# Patient Record
Sex: FEMALE | Race: WHITE | Employment: FULL TIME
[De-identification: names, ages, dates, MRNs, and addresses within clinical notes are randomized per-mention and may not be internally consistent; named-entity substitution may affect disease eponyms.]

---

## 2017-07-29 ENCOUNTER — HEALTH MAINTENANCE LETTER (OUTPATIENT)
Age: 52
End: 2017-07-29

## 2017-08-21 ENCOUNTER — COMMUNICATION - HEALTHEAST (OUTPATIENT)
Dept: UROLOGY | Facility: CLINIC | Age: 52
End: 2017-08-21

## 2017-08-21 DIAGNOSIS — N20.9 URINARY CALCULUS, UNSPECIFIED: ICD-10-CM

## 2017-08-22 ENCOUNTER — OFFICE VISIT - HEALTHEAST (OUTPATIENT)
Dept: UROLOGY | Facility: CLINIC | Age: 52
End: 2017-08-22

## 2017-08-22 ENCOUNTER — HOSPITAL ENCOUNTER (OUTPATIENT)
Dept: CT IMAGING | Facility: CLINIC | Age: 52
Discharge: HOME | End: 2017-08-22
Attending: PHYSICIAN ASSISTANT

## 2017-08-22 DIAGNOSIS — N20.9 URINARY CALCULUS, UNSPECIFIED: ICD-10-CM

## 2017-08-22 DIAGNOSIS — M54.50 ACUTE LEFT-SIDED LOW BACK PAIN WITHOUT SCIATICA: ICD-10-CM

## 2017-08-28 ENCOUNTER — HOSPITAL ENCOUNTER (OUTPATIENT)
Dept: PHYSICAL MEDICINE AND REHAB | Facility: CLINIC | Age: 52
Discharge: HOME | End: 2017-08-28
Attending: PHYSICIAN ASSISTANT

## 2017-08-28 DIAGNOSIS — M54.16 LUMBAR RADICULITIS: ICD-10-CM

## 2017-08-28 DIAGNOSIS — M79.18 MYOFASCIAL PAIN: ICD-10-CM

## 2017-08-28 DIAGNOSIS — M54.50 LUMBALGIA: ICD-10-CM

## 2017-08-28 ASSESSMENT — MIFFLIN-ST. JEOR: SCORE: 1290.8

## 2017-12-06 ENCOUNTER — OFFICE VISIT - HEALTHEAST (OUTPATIENT)
Dept: FAMILY MEDICINE | Facility: CLINIC | Age: 52
End: 2017-12-06

## 2017-12-06 DIAGNOSIS — G47.00 INSOMNIA: ICD-10-CM

## 2017-12-06 DIAGNOSIS — F33.9 MAJOR DEPRESSION, RECURRENT (H): ICD-10-CM

## 2017-12-06 DIAGNOSIS — Z12.31 VISIT FOR SCREENING MAMMOGRAM: ICD-10-CM

## 2017-12-06 DIAGNOSIS — F41.9 ACUTE ANXIETY: ICD-10-CM

## 2017-12-06 ASSESSMENT — MIFFLIN-ST. JEOR: SCORE: 1266.53

## 2018-01-04 ENCOUNTER — COMMUNICATION - HEALTHEAST (OUTPATIENT)
Dept: FAMILY MEDICINE | Facility: CLINIC | Age: 53
End: 2018-01-04

## 2018-08-20 ENCOUNTER — COMMUNICATION - HEALTHEAST (OUTPATIENT)
Dept: FAMILY MEDICINE | Facility: CLINIC | Age: 53
End: 2018-08-20

## 2018-08-20 DIAGNOSIS — F33.9 MAJOR DEPRESSION, RECURRENT (H): ICD-10-CM

## 2019-04-30 ENCOUNTER — RECORDS - HEALTHEAST (OUTPATIENT)
Dept: MAMMOGRAPHY | Facility: CLINIC | Age: 54
End: 2019-04-30

## 2019-04-30 DIAGNOSIS — Z12.31 ENCOUNTER FOR SCREENING MAMMOGRAM FOR MALIGNANT NEOPLASM OF BREAST: ICD-10-CM

## 2019-05-02 ENCOUNTER — COMMUNICATION - HEALTHEAST (OUTPATIENT)
Dept: ADMINISTRATIVE | Facility: CLINIC | Age: 54
End: 2019-05-02

## 2019-05-02 ENCOUNTER — RECORDS - HEALTHEAST (OUTPATIENT)
Dept: ADMINISTRATIVE | Facility: OTHER | Age: 54
End: 2019-05-02

## 2019-05-07 ENCOUNTER — RECORDS - HEALTHEAST (OUTPATIENT)
Dept: ADMINISTRATIVE | Facility: OTHER | Age: 54
End: 2019-05-07

## 2019-05-07 ENCOUNTER — RECORDS - HEALTHEAST (OUTPATIENT)
Dept: RADIOLOGY | Facility: CLINIC | Age: 54
End: 2019-05-07

## 2019-05-09 ENCOUNTER — HOSPITAL ENCOUNTER (OUTPATIENT)
Dept: MAMMOGRAPHY | Facility: CLINIC | Age: 54
Discharge: HOME | End: 2019-05-09
Attending: MIDWIFE

## 2019-05-09 DIAGNOSIS — N64.89 BREAST ASYMMETRY: ICD-10-CM

## 2019-06-17 ENCOUNTER — HOSPITAL ENCOUNTER (OUTPATIENT)
Dept: LAB | Facility: CLINIC | Age: 54
Discharge: HOME | End: 2019-06-17
Attending: FAMILY MEDICINE | Admitting: FAMILY MEDICINE
Payer: COMMERCIAL

## 2019-06-17 DIAGNOSIS — R51.9 FACIAL PAIN: ICD-10-CM

## 2019-06-17 DIAGNOSIS — M25.50 JOINT PAIN: ICD-10-CM

## 2019-06-17 DIAGNOSIS — E34.9: ICD-10-CM

## 2019-06-17 DIAGNOSIS — G47.9 SLEEP DISORDER: ICD-10-CM

## 2019-06-17 DIAGNOSIS — E06.3 AUTOIMMUNE HYPOTHYROIDISM: ICD-10-CM

## 2019-06-17 DIAGNOSIS — Z83.3 FAMILY HISTORY OF DIABETES MELLITUS: ICD-10-CM

## 2019-06-17 DIAGNOSIS — M79.10 MUSCLE PAIN: ICD-10-CM

## 2019-06-17 DIAGNOSIS — F32.A DEPRESSION, UNSPECIFIED DEPRESSION TYPE: ICD-10-CM

## 2019-06-17 LAB
ABO + RH BLD: NORMAL
ABO + RH BLD: NORMAL
ALBUMIN SERPL-MCNC: 4.2 G/DL (ref 3.4–5)
ALBUMIN UR-MCNC: NEGATIVE MG/DL
ALP SERPL-CCNC: 56 U/L (ref 40–150)
ALT SERPL W P-5'-P-CCNC: 21 U/L (ref 0–50)
ANION GAP SERPL CALCULATED.3IONS-SCNC: 7 MMOL/L (ref 3–14)
APPEARANCE UR: CLEAR
AST SERPL W P-5'-P-CCNC: 17 U/L (ref 0–45)
BASOPHILS # BLD AUTO: 0 10E9/L (ref 0–0.2)
BASOPHILS NFR BLD AUTO: 1.1 %
BILIRUB SERPL-MCNC: 0.5 MG/DL (ref 0.2–1.3)
BILIRUB UR QL STRIP: NEGATIVE
BLOOD BANK CMNT PATIENT-IMP: NORMAL
BUN SERPL-MCNC: 16 MG/DL (ref 7–30)
CALCIUM SERPL-MCNC: 9 MG/DL (ref 8.5–10.1)
CHLORIDE SERPL-SCNC: 106 MMOL/L (ref 94–109)
CHOLEST SERPL-MCNC: 187 MG/DL
CO2 SERPL-SCNC: 26 MMOL/L (ref 20–32)
COLOR UR AUTO: ABNORMAL
CREAT SERPL-MCNC: 0.76 MG/DL (ref 0.52–1.04)
DEPRECATED CALCIDIOL+CALCIFEROL SERPL-MC: 48 UG/L (ref 20–75)
DIFFERENTIAL METHOD BLD: ABNORMAL
EOSINOPHIL # BLD AUTO: 0.1 10E9/L (ref 0–0.7)
EOSINOPHIL NFR BLD AUTO: 1.6 %
ERYTHROCYTE [DISTWIDTH] IN BLOOD BY AUTOMATED COUNT: 12.8 % (ref 10–15)
FERRITIN SERPL-MCNC: 21 NG/ML (ref 8–252)
GFR SERPL CREATININE-BSD FRML MDRD: 90 ML/MIN/{1.73_M2}
GLUCOSE SERPL-MCNC: 77 MG/DL (ref 70–99)
GLUCOSE UR STRIP-MCNC: NEGATIVE MG/DL
HBA1C MFR BLD: 5.5 % (ref 0–5.6)
HCT VFR BLD AUTO: 37.2 % (ref 35–47)
HDLC SERPL-MCNC: 68 MG/DL
HGB BLD-MCNC: 12.6 G/DL (ref 11.7–15.7)
HGB UR QL STRIP: NEGATIVE
IMM GRANULOCYTES # BLD: 0 10E9/L (ref 0–0.4)
IMM GRANULOCYTES NFR BLD: 0 %
INSULIN SERPL-ACNC: 2.6 MU/L (ref 3–25)
IRON SATN MFR SERPL: 17 % (ref 15–46)
IRON SERPL-MCNC: 61 UG/DL (ref 35–180)
KETONES UR STRIP-MCNC: NEGATIVE MG/DL
LDLC SERPL CALC-MCNC: 109 MG/DL
LEUKOCYTE ESTERASE UR QL STRIP: NEGATIVE
LYMPHOCYTES # BLD AUTO: 1.3 10E9/L (ref 0.8–5.3)
LYMPHOCYTES NFR BLD AUTO: 34.2 %
MCH RBC QN AUTO: 30.7 PG (ref 26.5–33)
MCHC RBC AUTO-ENTMCNC: 33.9 G/DL (ref 31.5–36.5)
MCV RBC AUTO: 91 FL (ref 78–100)
MONOCYTES # BLD AUTO: 0.4 10E9/L (ref 0–1.3)
MONOCYTES NFR BLD AUTO: 10.2 %
NEUTROPHILS # BLD AUTO: 2 10E9/L (ref 1.6–8.3)
NEUTROPHILS NFR BLD AUTO: 52.9 %
NITRATE UR QL: NEGATIVE
NONHDLC SERPL-MCNC: 119 MG/DL
PH UR STRIP: 6 PH (ref 5–7)
PLATELET # BLD AUTO: 215 10E9/L (ref 150–450)
POTASSIUM SERPL-SCNC: 3.7 MMOL/L (ref 3.4–5.3)
PROT SERPL-MCNC: 8.3 G/DL (ref 6.8–8.8)
RBC # BLD AUTO: 4.1 10E12/L (ref 3.8–5.2)
RBC #/AREA URNS AUTO: <1 /HPF (ref 0–2)
SODIUM SERPL-SCNC: 139 MMOL/L (ref 133–144)
SOURCE: ABNORMAL
SP GR UR STRIP: 1.01 (ref 1–1.03)
SPECIMEN EXP DATE BLD: NORMAL
SQUAMOUS #/AREA URNS AUTO: 2 /HPF (ref 0–1)
T3FREE SERPL-MCNC: 2.7 PG/ML (ref 2.3–4.2)
T4 FREE SERPL-MCNC: 0.91 NG/DL (ref 0.76–1.46)
TIBC SERPL-MCNC: 357 UG/DL (ref 240–430)
TRIGL SERPL-MCNC: 49 MG/DL
TSH SERPL DL<=0.005 MIU/L-ACNC: 2.72 MU/L (ref 0.4–4)
UROBILINOGEN UR STRIP-MCNC: NORMAL MG/DL (ref 0–2)
WBC # BLD AUTO: 3.3 10E9/L (ref 4–11)
WBC #/AREA URNS AUTO: 0 /HPF (ref 0–5)

## 2019-06-17 PROCEDURE — 84443 ASSAY THYROID STIM HORMONE: CPT | Performed by: FAMILY MEDICINE

## 2019-06-17 PROCEDURE — 84481 FREE ASSAY (FT-3): CPT | Performed by: FAMILY MEDICINE

## 2019-06-17 PROCEDURE — 85025 COMPLETE CBC W/AUTO DIFF WBC: CPT | Performed by: FAMILY MEDICINE

## 2019-06-17 PROCEDURE — 81001 URINALYSIS AUTO W/SCOPE: CPT | Performed by: FAMILY MEDICINE

## 2019-06-17 PROCEDURE — 83516 IMMUNOASSAY NONANTIBODY: CPT | Performed by: FAMILY MEDICINE

## 2019-06-17 PROCEDURE — 86618 LYME DISEASE ANTIBODY: CPT | Performed by: FAMILY MEDICINE

## 2019-06-17 PROCEDURE — 83036 HEMOGLOBIN GLYCOSYLATED A1C: CPT | Performed by: FAMILY MEDICINE

## 2019-06-17 PROCEDURE — 82784 ASSAY IGA/IGD/IGG/IGM EACH: CPT | Performed by: FAMILY MEDICINE

## 2019-06-17 PROCEDURE — 86038 ANTINUCLEAR ANTIBODIES: CPT | Performed by: FAMILY MEDICINE

## 2019-06-17 PROCEDURE — 83090 ASSAY OF HOMOCYSTEINE: CPT | Performed by: FAMILY MEDICINE

## 2019-06-17 PROCEDURE — 86376 MICROSOMAL ANTIBODY EACH: CPT | Performed by: FAMILY MEDICINE

## 2019-06-17 PROCEDURE — 80061 LIPID PANEL: CPT | Performed by: FAMILY MEDICINE

## 2019-06-17 PROCEDURE — 82306 VITAMIN D 25 HYDROXY: CPT | Performed by: FAMILY MEDICINE

## 2019-06-17 PROCEDURE — 83550 IRON BINDING TEST: CPT | Performed by: FAMILY MEDICINE

## 2019-06-17 PROCEDURE — 86800 THYROGLOBULIN ANTIBODY: CPT | Performed by: FAMILY MEDICINE

## 2019-06-17 PROCEDURE — 83540 ASSAY OF IRON: CPT | Performed by: FAMILY MEDICINE

## 2019-06-17 PROCEDURE — 82728 ASSAY OF FERRITIN: CPT | Performed by: FAMILY MEDICINE

## 2019-06-17 PROCEDURE — 86901 BLOOD TYPING SEROLOGIC RH(D): CPT | Performed by: FAMILY MEDICINE

## 2019-06-17 PROCEDURE — 36415 COLL VENOUS BLD VENIPUNCTURE: CPT | Performed by: FAMILY MEDICINE

## 2019-06-17 PROCEDURE — 84439 ASSAY OF FREE THYROXINE: CPT | Performed by: FAMILY MEDICINE

## 2019-06-17 PROCEDURE — 86900 BLOOD TYPING SEROLOGIC ABO: CPT | Performed by: FAMILY MEDICINE

## 2019-06-17 PROCEDURE — 80053 COMPREHEN METABOLIC PANEL: CPT | Performed by: FAMILY MEDICINE

## 2019-06-17 PROCEDURE — 83525 ASSAY OF INSULIN: CPT | Performed by: FAMILY MEDICINE

## 2019-06-17 PROCEDURE — 86141 C-REACTIVE PROTEIN HS: CPT | Performed by: FAMILY MEDICINE

## 2019-06-18 LAB
ANA SER QL IF: NEGATIVE
B BURGDOR IGG+IGM SER QL: 0.11 (ref 0–0.89)
CRP SERPL HS-MCNC: 0.5 MG/L
IGA SERPL-MCNC: 302 MG/DL (ref 70–380)
THYROGLOB AB SERPL IA-ACNC: <20 IU/ML (ref 0–40)
THYROPEROXIDASE AB SERPL-ACNC: <10 IU/ML
TTG IGA SER-ACNC: 1 U/ML
TTG IGG SER-ACNC: <1 U/ML

## 2019-06-19 LAB — HCYS SERPL-SCNC: 7.6 UMOL/L (ref 4–12)

## 2019-07-11 ENCOUNTER — HOSPITAL ENCOUNTER (OUTPATIENT)
Dept: PHYSICAL MEDICINE AND REHAB | Facility: CLINIC | Age: 54
Discharge: HOME | End: 2019-07-11
Attending: NURSE PRACTITIONER

## 2019-07-11 DIAGNOSIS — M54.14 THORACIC RADICULITIS: ICD-10-CM

## 2019-07-11 DIAGNOSIS — M54.6 CHRONIC BILATERAL THORACIC BACK PAIN: ICD-10-CM

## 2019-07-11 DIAGNOSIS — G89.29 CHRONIC BILATERAL THORACIC BACK PAIN: ICD-10-CM

## 2019-07-11 DIAGNOSIS — M79.18 MYOFASCIAL PAIN: ICD-10-CM

## 2019-07-11 DIAGNOSIS — M54.50 CHRONIC BILATERAL LOW BACK PAIN WITHOUT SCIATICA: ICD-10-CM

## 2019-07-11 DIAGNOSIS — M51.34 THORACIC DEGENERATIVE DISC DISEASE: ICD-10-CM

## 2019-07-11 DIAGNOSIS — G89.29 CHRONIC BILATERAL LOW BACK PAIN WITHOUT SCIATICA: ICD-10-CM

## 2019-07-19 ENCOUNTER — OFFICE VISIT - HEALTHEAST (OUTPATIENT)
Dept: PHYSICAL THERAPY | Facility: REHABILITATION | Age: 54
End: 2019-07-19

## 2019-07-19 DIAGNOSIS — R07.81 RIB PAIN: ICD-10-CM

## 2019-07-19 DIAGNOSIS — M54.6 PAIN IN THORACIC SPINE: ICD-10-CM

## 2019-07-19 DIAGNOSIS — G89.29 CHRONIC BILATERAL LOW BACK PAIN WITHOUT SCIATICA: ICD-10-CM

## 2019-07-19 DIAGNOSIS — M54.50 CHRONIC BILATERAL LOW BACK PAIN WITHOUT SCIATICA: ICD-10-CM

## 2019-07-30 ENCOUNTER — OFFICE VISIT - HEALTHEAST (OUTPATIENT)
Dept: PHYSICAL THERAPY | Facility: REHABILITATION | Age: 54
End: 2019-07-30

## 2019-07-30 DIAGNOSIS — M54.6 PAIN IN THORACIC SPINE: ICD-10-CM

## 2019-07-30 DIAGNOSIS — M54.50 CHRONIC BILATERAL LOW BACK PAIN WITHOUT SCIATICA: ICD-10-CM

## 2019-07-30 DIAGNOSIS — R07.81 RIB PAIN: ICD-10-CM

## 2019-07-30 DIAGNOSIS — G89.29 CHRONIC BILATERAL LOW BACK PAIN WITHOUT SCIATICA: ICD-10-CM

## 2019-08-06 ENCOUNTER — OFFICE VISIT - HEALTHEAST (OUTPATIENT)
Dept: PHYSICAL THERAPY | Facility: REHABILITATION | Age: 54
End: 2019-08-06

## 2019-08-06 DIAGNOSIS — R07.81 RIB PAIN: ICD-10-CM

## 2019-08-06 DIAGNOSIS — M54.6 PAIN IN THORACIC SPINE: ICD-10-CM

## 2019-08-06 DIAGNOSIS — G89.29 CHRONIC BILATERAL LOW BACK PAIN WITHOUT SCIATICA: ICD-10-CM

## 2019-08-06 DIAGNOSIS — M54.50 CHRONIC BILATERAL LOW BACK PAIN WITHOUT SCIATICA: ICD-10-CM

## 2019-08-08 ENCOUNTER — OFFICE VISIT - HEALTHEAST (OUTPATIENT)
Dept: PHYSICAL THERAPY | Facility: REHABILITATION | Age: 54
End: 2019-08-08

## 2019-08-08 DIAGNOSIS — M54.6 PAIN IN THORACIC SPINE: ICD-10-CM

## 2019-08-08 DIAGNOSIS — M54.50 CHRONIC BILATERAL LOW BACK PAIN WITHOUT SCIATICA: ICD-10-CM

## 2019-08-08 DIAGNOSIS — G89.29 CHRONIC BILATERAL LOW BACK PAIN WITHOUT SCIATICA: ICD-10-CM

## 2019-08-08 DIAGNOSIS — R07.81 RIB PAIN: ICD-10-CM

## 2019-08-13 ENCOUNTER — OFFICE VISIT - HEALTHEAST (OUTPATIENT)
Dept: PHYSICAL THERAPY | Facility: REHABILITATION | Age: 54
End: 2019-08-13

## 2019-08-13 DIAGNOSIS — G89.29 CHRONIC BILATERAL LOW BACK PAIN WITHOUT SCIATICA: ICD-10-CM

## 2019-08-13 DIAGNOSIS — R07.81 RIB PAIN: ICD-10-CM

## 2019-08-13 DIAGNOSIS — M54.50 CHRONIC BILATERAL LOW BACK PAIN WITHOUT SCIATICA: ICD-10-CM

## 2019-08-13 DIAGNOSIS — M54.6 PAIN IN THORACIC SPINE: ICD-10-CM

## 2019-08-15 ENCOUNTER — OFFICE VISIT - HEALTHEAST (OUTPATIENT)
Dept: PHYSICAL THERAPY | Facility: REHABILITATION | Age: 54
End: 2019-08-15

## 2019-08-15 DIAGNOSIS — M54.6 PAIN IN THORACIC SPINE: ICD-10-CM

## 2019-08-15 DIAGNOSIS — G89.29 CHRONIC BILATERAL LOW BACK PAIN WITHOUT SCIATICA: ICD-10-CM

## 2019-08-15 DIAGNOSIS — M54.50 CHRONIC BILATERAL LOW BACK PAIN WITHOUT SCIATICA: ICD-10-CM

## 2019-08-15 DIAGNOSIS — R07.81 RIB PAIN: ICD-10-CM

## 2019-08-20 ENCOUNTER — OFFICE VISIT - HEALTHEAST (OUTPATIENT)
Dept: PHYSICAL THERAPY | Facility: REHABILITATION | Age: 54
End: 2019-08-20

## 2019-08-20 DIAGNOSIS — M54.6 PAIN IN THORACIC SPINE: ICD-10-CM

## 2019-08-20 DIAGNOSIS — G89.29 CHRONIC BILATERAL LOW BACK PAIN WITHOUT SCIATICA: ICD-10-CM

## 2019-08-20 DIAGNOSIS — M54.50 CHRONIC BILATERAL LOW BACK PAIN WITHOUT SCIATICA: ICD-10-CM

## 2019-08-20 DIAGNOSIS — R07.81 RIB PAIN: ICD-10-CM

## 2019-08-22 ENCOUNTER — OFFICE VISIT - HEALTHEAST (OUTPATIENT)
Dept: PHYSICAL THERAPY | Facility: REHABILITATION | Age: 54
End: 2019-08-22

## 2019-08-22 DIAGNOSIS — G89.29 CHRONIC BILATERAL LOW BACK PAIN WITHOUT SCIATICA: ICD-10-CM

## 2019-08-22 DIAGNOSIS — M54.6 PAIN IN THORACIC SPINE: ICD-10-CM

## 2019-08-22 DIAGNOSIS — R07.81 RIB PAIN: ICD-10-CM

## 2019-08-22 DIAGNOSIS — M54.50 CHRONIC BILATERAL LOW BACK PAIN WITHOUT SCIATICA: ICD-10-CM

## 2019-08-27 ENCOUNTER — OFFICE VISIT - HEALTHEAST (OUTPATIENT)
Dept: PHYSICAL THERAPY | Facility: REHABILITATION | Age: 54
End: 2019-08-27

## 2019-08-27 DIAGNOSIS — M54.50 CHRONIC BILATERAL LOW BACK PAIN WITHOUT SCIATICA: ICD-10-CM

## 2019-08-27 DIAGNOSIS — M54.6 PAIN IN THORACIC SPINE: ICD-10-CM

## 2019-08-27 DIAGNOSIS — R07.81 RIB PAIN: ICD-10-CM

## 2019-08-27 DIAGNOSIS — G89.29 CHRONIC BILATERAL LOW BACK PAIN WITHOUT SCIATICA: ICD-10-CM

## 2019-08-29 ENCOUNTER — COMMUNICATION - HEALTHEAST (OUTPATIENT)
Dept: PHYSICAL MEDICINE AND REHAB | Facility: CLINIC | Age: 54
End: 2019-08-29

## 2019-08-29 ENCOUNTER — AMBULATORY - HEALTHEAST (OUTPATIENT)
Dept: PHYSICAL MEDICINE AND REHAB | Facility: CLINIC | Age: 54
End: 2019-08-29

## 2019-08-29 ENCOUNTER — OFFICE VISIT - HEALTHEAST (OUTPATIENT)
Dept: PHYSICAL THERAPY | Facility: REHABILITATION | Age: 54
End: 2019-08-29

## 2019-08-29 DIAGNOSIS — M54.6 CHRONIC BILATERAL THORACIC BACK PAIN: ICD-10-CM

## 2019-08-29 DIAGNOSIS — M51.34 THORACIC DEGENERATIVE DISC DISEASE: ICD-10-CM

## 2019-08-29 DIAGNOSIS — M47.816 LUMBAR FACET ARTHROPATHY: ICD-10-CM

## 2019-08-29 DIAGNOSIS — G89.29 CHRONIC BILATERAL THORACIC BACK PAIN: ICD-10-CM

## 2019-08-29 DIAGNOSIS — G89.29 CHRONIC BILATERAL LOW BACK PAIN WITHOUT SCIATICA: ICD-10-CM

## 2019-08-29 DIAGNOSIS — R07.81 RIB PAIN: ICD-10-CM

## 2019-08-29 DIAGNOSIS — M54.50 CHRONIC BILATERAL LOW BACK PAIN WITHOUT SCIATICA: ICD-10-CM

## 2019-08-29 DIAGNOSIS — M54.14 THORACIC RADICULITIS: ICD-10-CM

## 2019-08-29 DIAGNOSIS — M79.18 MYOFASCIAL PAIN: ICD-10-CM

## 2019-08-29 DIAGNOSIS — M54.6 PAIN IN THORACIC SPINE: ICD-10-CM

## 2019-09-03 ENCOUNTER — OFFICE VISIT - HEALTHEAST (OUTPATIENT)
Dept: PHYSICAL THERAPY | Facility: REHABILITATION | Age: 54
End: 2019-09-03

## 2019-09-03 DIAGNOSIS — G89.29 CHRONIC BILATERAL LOW BACK PAIN WITHOUT SCIATICA: ICD-10-CM

## 2019-09-03 DIAGNOSIS — M54.50 CHRONIC BILATERAL LOW BACK PAIN WITHOUT SCIATICA: ICD-10-CM

## 2019-09-03 DIAGNOSIS — M54.6 PAIN IN THORACIC SPINE: ICD-10-CM

## 2019-09-03 DIAGNOSIS — R07.81 RIB PAIN: ICD-10-CM

## 2019-09-05 ENCOUNTER — OFFICE VISIT - HEALTHEAST (OUTPATIENT)
Dept: PHYSICAL THERAPY | Facility: REHABILITATION | Age: 54
End: 2019-09-05

## 2019-09-05 ENCOUNTER — HOSPITAL ENCOUNTER (OUTPATIENT)
Dept: MRI IMAGING | Facility: CLINIC | Age: 54
Discharge: HOME | End: 2019-09-05

## 2019-09-05 DIAGNOSIS — M47.816 LUMBAR FACET ARTHROPATHY: ICD-10-CM

## 2019-09-05 DIAGNOSIS — M54.50 CHRONIC BILATERAL LOW BACK PAIN WITHOUT SCIATICA: ICD-10-CM

## 2019-09-05 DIAGNOSIS — M54.14 THORACIC RADICULITIS: ICD-10-CM

## 2019-09-05 DIAGNOSIS — G89.29 CHRONIC BILATERAL THORACIC BACK PAIN: ICD-10-CM

## 2019-09-05 DIAGNOSIS — G89.29 CHRONIC BILATERAL LOW BACK PAIN WITHOUT SCIATICA: ICD-10-CM

## 2019-09-05 DIAGNOSIS — R07.81 RIB PAIN: ICD-10-CM

## 2019-09-05 DIAGNOSIS — M54.6 CHRONIC BILATERAL THORACIC BACK PAIN: ICD-10-CM

## 2019-09-05 DIAGNOSIS — M54.6 PAIN IN THORACIC SPINE: ICD-10-CM

## 2019-09-05 DIAGNOSIS — M51.34 THORACIC DEGENERATIVE DISC DISEASE: ICD-10-CM

## 2019-09-10 ENCOUNTER — COMMUNICATION - HEALTHEAST (OUTPATIENT)
Dept: PHYSICAL MEDICINE AND REHAB | Facility: CLINIC | Age: 54
End: 2019-09-10

## 2019-09-12 ENCOUNTER — OFFICE VISIT - HEALTHEAST (OUTPATIENT)
Dept: PHYSICAL THERAPY | Facility: REHABILITATION | Age: 54
End: 2019-09-12

## 2019-09-12 DIAGNOSIS — G89.29 CHRONIC BILATERAL LOW BACK PAIN WITHOUT SCIATICA: ICD-10-CM

## 2019-09-12 DIAGNOSIS — R07.81 RIB PAIN: ICD-10-CM

## 2019-09-12 DIAGNOSIS — M54.50 CHRONIC BILATERAL LOW BACK PAIN WITHOUT SCIATICA: ICD-10-CM

## 2019-09-12 DIAGNOSIS — M54.6 PAIN IN THORACIC SPINE: ICD-10-CM

## 2019-09-20 ENCOUNTER — OFFICE VISIT - HEALTHEAST (OUTPATIENT)
Dept: PHYSICAL THERAPY | Facility: REHABILITATION | Age: 54
End: 2019-09-20

## 2019-09-20 DIAGNOSIS — R07.81 RIB PAIN: ICD-10-CM

## 2019-09-20 DIAGNOSIS — G89.29 CHRONIC BILATERAL LOW BACK PAIN WITHOUT SCIATICA: ICD-10-CM

## 2019-09-20 DIAGNOSIS — M54.50 CHRONIC BILATERAL LOW BACK PAIN WITHOUT SCIATICA: ICD-10-CM

## 2019-09-20 DIAGNOSIS — M54.6 PAIN IN THORACIC SPINE: ICD-10-CM

## 2019-09-24 ENCOUNTER — OFFICE VISIT - HEALTHEAST (OUTPATIENT)
Dept: PHYSICAL THERAPY | Facility: REHABILITATION | Age: 54
End: 2019-09-24

## 2019-09-24 ENCOUNTER — VIRTUAL VISIT (OUTPATIENT)
Dept: FAMILY MEDICINE | Facility: OTHER | Age: 54
End: 2019-09-24

## 2019-09-24 DIAGNOSIS — G89.29 CHRONIC BILATERAL LOW BACK PAIN WITHOUT SCIATICA: ICD-10-CM

## 2019-09-24 DIAGNOSIS — R07.81 RIB PAIN: ICD-10-CM

## 2019-09-24 DIAGNOSIS — M54.6 PAIN IN THORACIC SPINE: ICD-10-CM

## 2019-09-24 DIAGNOSIS — M54.50 CHRONIC BILATERAL LOW BACK PAIN WITHOUT SCIATICA: ICD-10-CM

## 2019-09-24 NOTE — PROGRESS NOTES
"Date:   Clinician: Dennis Weathers  Clinician NPI: [id number]  Patient: Aysha vieira  Patient : 1965  Patient Address: [address] [place] [address]  Patient Phone: [phone]  Visit Protocol: URI  Patient Summary:  Aysha is a 53 year old ( : 1965 ) female who initiated a Visit for cold, sinus infection, or influenza. When asked the question \"Please sign me up to receive news, health information and promotions from [employer].\", Aysha responded \"No\".    Aysha states her symptoms started gradually 7-9 days ago. After her symptoms started, they improved and then got worse again.   Her symptoms consist of malaise, a cough, facial pain or pressure, and nasal congestion. She is experiencing mild difficulty breathing with activities but can speak normally in full sentences.   Symptom details     Nasal secretions: The color of her mucus is clear.    Cough: Aysha coughs every 5-10 minutes and her cough is not more bothersome at night. Phlegm comes into her throat when she coughs. She believes the phlegm causes the cough. The color of the phlegm is clear.     Facial pain or pressure: The facial pain or pressure feels worse when bending over or leaning forward.      Aysha denies having rhinitis, wheezing, teeth pain, headache, sore throat, fever, myalgias, chills, and ear pain. She also denies having recent facial or sinus surgery in the past 60 days, having a sinus infection within the past year, and taking antibiotic medication for the symptoms.   Precipitating events  She has not recently been exposed to someone with influenza. Aysha has not been in close contact with any high risk individuals.   Pertinent medical history  Aysha does not get yeast infections when she takes antibiotics.   Weight: 135 lbs   Aysha does not smoke or use smokeless tobacco.   Additional information as reported by the patient (free text): it started with sore throat, exhausted, runny nose " and severe sinus headaches last Monday with fever around 100. Those symptoms shifted midweek and then the cough started, head still congested and now heavy chest and still exhausted. Staying home from work several days.     MEDICATIONS: Imitrex oral, gabapentin oral, ALLERGIES: NKDA  Clinician Response:  Dear Aysha,  Based on the information provided, you have acute bacterial sinusitis, also known as a sinus infection. Sinus infections are caused by bacteria or a virus and symptoms are almost always identical. The difference between the 2 types of infections is timing.  Sinus infections start as viral infections and symptoms improve on their own in about 7 days. If symptoms have not improved after 7 days or have even worsened, a bacterial infection may have developed.  Medication information  I am prescribing:       Fluticasone 50 mcg/actuation nasal spray. Inhale 2 sprays in each nostril 1 time per day; after 1 week, may adjust to 1 - 2 sprays in each nostril 1 time per day. This medication takes several days to start working, so keep taking it even if it doesn't help right away. There are no refills with this prescription.      Amoxicillin 500 mg oral tablet. Take 1 tablet by mouth every 8 hours for 10 days. There are no refills with this prescription.     Yeast infections can be a common side effect of antibiotics. The most common symptom of a yeast infection is itchiness in and around the vagina. Other signs and symptoms include burning, redness, or a thick, white vaginal discharge that looks like cottage cheese and does not have a bad smell.  Self care  The following tips will keep you as comfortable as possible while you recover:     Rest    Drink plenty of water and other liquids    Take a hot shower to loosen congestion    Take a spoonful of honey to reduce your cough     When to seek care  Please be seen in a clinic or urgent care if any of the following occur:     Symptoms do not start to improve after 3  days of treatment    New symptoms develop, or symptoms become worse     It is possible to have an allergic reaction to an antibiotic even if you have not had one in the past. If you notice a new rash, significant swelling, or difficulty breathing, stop taking this medication immediately and go to a clinic or urgent care.   Diagnosis: Acute bacterial sinusitis  Diagnosis ICD: J01.90  Prescription: fluticasone 50 mcg/actuation nasal spray,suspension 1 120 spray aerosol with adapter (grams), 30 days supply. Inhale 2 sprays in each nostril 1 time per day; after 1 week, may adjust to 1 - 2 sprays in each nostril 1 time per day.. Refills: 0, Refill as needed: no, Allow substitutions: yes  Prescription: amoxicillin 500 mg oral tablet 30 tablet, 10 days supply. Take 1 tablet by mouth every 8 hours for 10 days. Refills: 0, Refill as needed: no, Allow substitutions: yes  Pharmacy: [id number] - [phone] - [address] [facility] , [place] [address]

## 2019-10-03 ENCOUNTER — OFFICE VISIT - HEALTHEAST (OUTPATIENT)
Dept: PHYSICAL THERAPY | Facility: REHABILITATION | Age: 54
End: 2019-10-03

## 2019-10-03 DIAGNOSIS — M54.6 PAIN IN THORACIC SPINE: ICD-10-CM

## 2019-10-03 DIAGNOSIS — R07.81 RIB PAIN: ICD-10-CM

## 2019-10-03 DIAGNOSIS — G89.29 CHRONIC BILATERAL LOW BACK PAIN WITHOUT SCIATICA: ICD-10-CM

## 2019-10-03 DIAGNOSIS — M54.50 CHRONIC BILATERAL LOW BACK PAIN WITHOUT SCIATICA: ICD-10-CM

## 2019-10-08 ENCOUNTER — OFFICE VISIT - HEALTHEAST (OUTPATIENT)
Dept: PHYSICAL THERAPY | Facility: REHABILITATION | Age: 54
End: 2019-10-08

## 2019-10-08 DIAGNOSIS — M54.50 CHRONIC BILATERAL LOW BACK PAIN WITHOUT SCIATICA: ICD-10-CM

## 2019-10-08 DIAGNOSIS — G89.29 CHRONIC BILATERAL LOW BACK PAIN WITHOUT SCIATICA: ICD-10-CM

## 2019-10-08 DIAGNOSIS — R07.81 RIB PAIN: ICD-10-CM

## 2019-10-08 DIAGNOSIS — M54.6 PAIN IN THORACIC SPINE: ICD-10-CM

## 2019-10-10 ENCOUNTER — OFFICE VISIT - HEALTHEAST (OUTPATIENT)
Dept: PHYSICAL THERAPY | Facility: REHABILITATION | Age: 54
End: 2019-10-10

## 2019-10-10 DIAGNOSIS — M54.50 CHRONIC BILATERAL LOW BACK PAIN WITHOUT SCIATICA: ICD-10-CM

## 2019-10-10 DIAGNOSIS — R07.81 RIB PAIN: ICD-10-CM

## 2019-10-10 DIAGNOSIS — M54.6 PAIN IN THORACIC SPINE: ICD-10-CM

## 2019-10-10 DIAGNOSIS — G89.29 CHRONIC BILATERAL LOW BACK PAIN WITHOUT SCIATICA: ICD-10-CM

## 2019-10-23 ENCOUNTER — OFFICE VISIT - HEALTHEAST (OUTPATIENT)
Dept: PHYSICAL THERAPY | Facility: REHABILITATION | Age: 54
End: 2019-10-23

## 2019-10-23 DIAGNOSIS — G89.29 CHRONIC BILATERAL LOW BACK PAIN WITHOUT SCIATICA: ICD-10-CM

## 2019-10-23 DIAGNOSIS — M54.50 CHRONIC BILATERAL LOW BACK PAIN WITHOUT SCIATICA: ICD-10-CM

## 2019-10-23 DIAGNOSIS — M54.6 PAIN IN THORACIC SPINE: ICD-10-CM

## 2019-10-23 DIAGNOSIS — R07.81 RIB PAIN: ICD-10-CM

## 2019-10-25 ENCOUNTER — OFFICE VISIT - HEALTHEAST (OUTPATIENT)
Dept: PHYSICAL THERAPY | Facility: REHABILITATION | Age: 54
End: 2019-10-25

## 2019-10-25 DIAGNOSIS — R07.81 RIB PAIN: ICD-10-CM

## 2019-10-25 DIAGNOSIS — M54.50 CHRONIC BILATERAL LOW BACK PAIN WITHOUT SCIATICA: ICD-10-CM

## 2019-10-25 DIAGNOSIS — G89.29 CHRONIC BILATERAL LOW BACK PAIN WITHOUT SCIATICA: ICD-10-CM

## 2019-10-25 DIAGNOSIS — M54.6 PAIN IN THORACIC SPINE: ICD-10-CM

## 2019-10-29 ENCOUNTER — OFFICE VISIT - HEALTHEAST (OUTPATIENT)
Dept: PHYSICAL THERAPY | Facility: REHABILITATION | Age: 54
End: 2019-10-29

## 2019-10-29 DIAGNOSIS — G89.29 CHRONIC BILATERAL LOW BACK PAIN WITHOUT SCIATICA: ICD-10-CM

## 2019-10-29 DIAGNOSIS — M54.50 CHRONIC BILATERAL LOW BACK PAIN WITHOUT SCIATICA: ICD-10-CM

## 2019-10-29 DIAGNOSIS — R07.81 RIB PAIN: ICD-10-CM

## 2019-10-29 DIAGNOSIS — M54.6 PAIN IN THORACIC SPINE: ICD-10-CM

## 2019-11-01 ENCOUNTER — OFFICE VISIT - HEALTHEAST (OUTPATIENT)
Dept: PHYSICAL THERAPY | Facility: REHABILITATION | Age: 54
End: 2019-11-01

## 2019-11-01 DIAGNOSIS — R07.81 RIB PAIN: ICD-10-CM

## 2019-11-01 DIAGNOSIS — M54.50 CHRONIC BILATERAL LOW BACK PAIN WITHOUT SCIATICA: ICD-10-CM

## 2019-11-01 DIAGNOSIS — M54.6 PAIN IN THORACIC SPINE: ICD-10-CM

## 2019-11-01 DIAGNOSIS — G89.29 CHRONIC BILATERAL LOW BACK PAIN WITHOUT SCIATICA: ICD-10-CM

## 2019-11-04 ENCOUNTER — HEALTH MAINTENANCE LETTER (OUTPATIENT)
Age: 54
End: 2019-11-04

## 2019-11-05 ENCOUNTER — OFFICE VISIT - HEALTHEAST (OUTPATIENT)
Dept: PHYSICAL THERAPY | Facility: REHABILITATION | Age: 54
End: 2019-11-05

## 2019-11-05 DIAGNOSIS — M54.6 PAIN IN THORACIC SPINE: ICD-10-CM

## 2019-11-05 DIAGNOSIS — R07.81 RIB PAIN: ICD-10-CM

## 2019-11-05 DIAGNOSIS — M54.50 CHRONIC BILATERAL LOW BACK PAIN WITHOUT SCIATICA: ICD-10-CM

## 2019-11-05 DIAGNOSIS — G89.29 CHRONIC BILATERAL LOW BACK PAIN WITHOUT SCIATICA: ICD-10-CM

## 2019-11-07 ENCOUNTER — OFFICE VISIT - HEALTHEAST (OUTPATIENT)
Dept: PHYSICAL THERAPY | Facility: REHABILITATION | Age: 54
End: 2019-11-07

## 2019-11-07 DIAGNOSIS — G43.009 MIGRAINE WITHOUT AURA: ICD-10-CM

## 2019-11-07 DIAGNOSIS — M54.50 CHRONIC BILATERAL LOW BACK PAIN WITHOUT SCIATICA: ICD-10-CM

## 2019-11-07 DIAGNOSIS — M54.2 CERVICALGIA: ICD-10-CM

## 2019-11-07 DIAGNOSIS — G89.29 CHRONIC BILATERAL LOW BACK PAIN WITHOUT SCIATICA: ICD-10-CM

## 2019-11-07 DIAGNOSIS — M54.6 PAIN IN THORACIC SPINE: ICD-10-CM

## 2019-11-07 DIAGNOSIS — R07.81 RIB PAIN: ICD-10-CM

## 2019-11-07 DIAGNOSIS — G43.011 INTRACTABLE MIGRAINE WITHOUT AURA AND WITH STATUS MIGRAINOSUS: ICD-10-CM

## 2019-11-11 ENCOUNTER — OFFICE VISIT - HEALTHEAST (OUTPATIENT)
Dept: PHYSICAL THERAPY | Facility: REHABILITATION | Age: 54
End: 2019-11-11

## 2019-11-11 DIAGNOSIS — M54.2 CERVICALGIA: ICD-10-CM

## 2019-11-11 DIAGNOSIS — G89.29 CHRONIC BILATERAL LOW BACK PAIN WITHOUT SCIATICA: ICD-10-CM

## 2019-11-11 DIAGNOSIS — M54.50 CHRONIC BILATERAL LOW BACK PAIN WITHOUT SCIATICA: ICD-10-CM

## 2019-11-11 DIAGNOSIS — R07.81 RIB PAIN: ICD-10-CM

## 2019-11-11 DIAGNOSIS — G43.009 MIGRAINE WITHOUT AURA AND WITHOUT STATUS MIGRAINOSUS, NOT INTRACTABLE: ICD-10-CM

## 2019-11-11 DIAGNOSIS — M54.6 PAIN IN THORACIC SPINE: ICD-10-CM

## 2019-11-11 DIAGNOSIS — G43.009 MIGRAINE WITHOUT AURA: ICD-10-CM

## 2019-11-19 ENCOUNTER — COMMUNICATION - HEALTHEAST (OUTPATIENT)
Dept: PHYSICAL THERAPY | Facility: REHABILITATION | Age: 54
End: 2019-11-19

## 2019-11-19 ENCOUNTER — OFFICE VISIT - HEALTHEAST (OUTPATIENT)
Dept: PHYSICAL THERAPY | Facility: REHABILITATION | Age: 54
End: 2019-11-19

## 2019-11-19 DIAGNOSIS — M54.50 CHRONIC BILATERAL LOW BACK PAIN WITHOUT SCIATICA: ICD-10-CM

## 2019-11-19 DIAGNOSIS — M54.2 CERVICALGIA: ICD-10-CM

## 2019-11-19 DIAGNOSIS — G43.009 MIGRAINE WITHOUT AURA AND WITHOUT STATUS MIGRAINOSUS, NOT INTRACTABLE: ICD-10-CM

## 2019-11-19 DIAGNOSIS — G89.29 CHRONIC BILATERAL LOW BACK PAIN WITHOUT SCIATICA: ICD-10-CM

## 2019-11-19 DIAGNOSIS — M54.6 PAIN IN THORACIC SPINE: ICD-10-CM

## 2019-11-19 DIAGNOSIS — R07.81 RIB PAIN: ICD-10-CM

## 2019-11-22 ENCOUNTER — OFFICE VISIT - HEALTHEAST (OUTPATIENT)
Dept: PHYSICAL THERAPY | Facility: REHABILITATION | Age: 54
End: 2019-11-22

## 2019-11-22 DIAGNOSIS — M54.50 CHRONIC BILATERAL LOW BACK PAIN WITHOUT SCIATICA: ICD-10-CM

## 2019-11-22 DIAGNOSIS — G43.009 MIGRAINE WITHOUT AURA AND WITHOUT STATUS MIGRAINOSUS, NOT INTRACTABLE: ICD-10-CM

## 2019-11-22 DIAGNOSIS — M54.6 PAIN IN THORACIC SPINE: ICD-10-CM

## 2019-11-22 DIAGNOSIS — R07.81 RIB PAIN: ICD-10-CM

## 2019-11-22 DIAGNOSIS — G89.29 CHRONIC BILATERAL LOW BACK PAIN WITHOUT SCIATICA: ICD-10-CM

## 2019-11-22 DIAGNOSIS — M54.2 CERVICALGIA: ICD-10-CM

## 2019-11-25 ENCOUNTER — OFFICE VISIT - HEALTHEAST (OUTPATIENT)
Dept: PHYSICAL THERAPY | Facility: REHABILITATION | Age: 54
End: 2019-11-25

## 2019-11-25 DIAGNOSIS — M54.2 CERVICALGIA: ICD-10-CM

## 2019-11-25 DIAGNOSIS — G43.009 MIGRAINE WITHOUT AURA AND WITHOUT STATUS MIGRAINOSUS, NOT INTRACTABLE: ICD-10-CM

## 2019-11-25 DIAGNOSIS — M54.50 CHRONIC BILATERAL LOW BACK PAIN WITHOUT SCIATICA: ICD-10-CM

## 2019-11-25 DIAGNOSIS — M54.6 PAIN IN THORACIC SPINE: ICD-10-CM

## 2019-11-25 DIAGNOSIS — G89.29 CHRONIC BILATERAL LOW BACK PAIN WITHOUT SCIATICA: ICD-10-CM

## 2019-11-25 DIAGNOSIS — R07.81 RIB PAIN: ICD-10-CM

## 2019-12-02 ENCOUNTER — OFFICE VISIT - HEALTHEAST (OUTPATIENT)
Dept: PHYSICAL THERAPY | Facility: REHABILITATION | Age: 54
End: 2019-12-02

## 2019-12-02 DIAGNOSIS — M54.2 CERVICALGIA: ICD-10-CM

## 2019-12-02 DIAGNOSIS — G89.29 CHRONIC BILATERAL LOW BACK PAIN WITHOUT SCIATICA: ICD-10-CM

## 2019-12-02 DIAGNOSIS — G43.009 MIGRAINE WITHOUT AURA AND WITHOUT STATUS MIGRAINOSUS, NOT INTRACTABLE: ICD-10-CM

## 2019-12-02 DIAGNOSIS — M54.6 PAIN IN THORACIC SPINE: ICD-10-CM

## 2019-12-02 DIAGNOSIS — M54.50 CHRONIC BILATERAL LOW BACK PAIN WITHOUT SCIATICA: ICD-10-CM

## 2019-12-02 DIAGNOSIS — R07.81 RIB PAIN: ICD-10-CM

## 2019-12-10 ENCOUNTER — OFFICE VISIT - HEALTHEAST (OUTPATIENT)
Dept: PHYSICAL THERAPY | Facility: REHABILITATION | Age: 54
End: 2019-12-10

## 2019-12-10 DIAGNOSIS — M54.2 CERVICALGIA: ICD-10-CM

## 2019-12-10 DIAGNOSIS — G43.009 MIGRAINE WITHOUT AURA AND WITHOUT STATUS MIGRAINOSUS, NOT INTRACTABLE: ICD-10-CM

## 2019-12-10 DIAGNOSIS — M54.50 CHRONIC BILATERAL LOW BACK PAIN WITHOUT SCIATICA: ICD-10-CM

## 2019-12-10 DIAGNOSIS — M54.6 PAIN IN THORACIC SPINE: ICD-10-CM

## 2019-12-10 DIAGNOSIS — R07.81 RIB PAIN: ICD-10-CM

## 2019-12-10 DIAGNOSIS — G89.29 CHRONIC BILATERAL LOW BACK PAIN WITHOUT SCIATICA: ICD-10-CM

## 2019-12-12 ENCOUNTER — HOSPITAL ENCOUNTER (OUTPATIENT)
Dept: PHYSICAL MEDICINE AND REHAB | Facility: CLINIC | Age: 54
Discharge: HOME | End: 2019-12-12
Attending: NURSE PRACTITIONER

## 2019-12-12 DIAGNOSIS — G89.29 CHRONIC BILATERAL THORACIC BACK PAIN: ICD-10-CM

## 2019-12-12 DIAGNOSIS — M51.24 THORACIC DISC HERNIATION: ICD-10-CM

## 2019-12-12 DIAGNOSIS — M47.816 LUMBAR FACET ARTHROPATHY: ICD-10-CM

## 2019-12-12 DIAGNOSIS — M54.14 THORACIC RADICULITIS: ICD-10-CM

## 2019-12-12 DIAGNOSIS — M54.6 CHRONIC BILATERAL THORACIC BACK PAIN: ICD-10-CM

## 2019-12-12 DIAGNOSIS — M51.34 THORACIC DEGENERATIVE DISC DISEASE: ICD-10-CM

## 2019-12-12 DIAGNOSIS — G89.29 CHRONIC BILATERAL LOW BACK PAIN WITHOUT SCIATICA: ICD-10-CM

## 2019-12-12 DIAGNOSIS — M54.50 CHRONIC BILATERAL LOW BACK PAIN WITHOUT SCIATICA: ICD-10-CM

## 2019-12-12 DIAGNOSIS — M79.18 MYOFASCIAL PAIN: ICD-10-CM

## 2019-12-20 ENCOUNTER — OFFICE VISIT - HEALTHEAST (OUTPATIENT)
Dept: PHYSICAL THERAPY | Facility: REHABILITATION | Age: 54
End: 2019-12-20

## 2019-12-20 DIAGNOSIS — M54.6 PAIN IN THORACIC SPINE: ICD-10-CM

## 2019-12-20 DIAGNOSIS — M54.50 CHRONIC BILATERAL LOW BACK PAIN WITHOUT SCIATICA: ICD-10-CM

## 2019-12-20 DIAGNOSIS — R07.81 RIB PAIN: ICD-10-CM

## 2019-12-20 DIAGNOSIS — G89.29 CHRONIC BILATERAL LOW BACK PAIN WITHOUT SCIATICA: ICD-10-CM

## 2019-12-20 DIAGNOSIS — G43.009 MIGRAINE WITHOUT AURA AND WITHOUT STATUS MIGRAINOSUS, NOT INTRACTABLE: ICD-10-CM

## 2019-12-20 DIAGNOSIS — M54.2 CERVICALGIA: ICD-10-CM

## 2020-11-22 ENCOUNTER — HEALTH MAINTENANCE LETTER (OUTPATIENT)
Age: 55
End: 2020-11-22

## 2021-02-13 ENCOUNTER — HEALTH MAINTENANCE LETTER (OUTPATIENT)
Age: 56
End: 2021-02-13

## 2021-05-30 NOTE — PROGRESS NOTES
Assessment:     Diagnoses and all orders for this visit:    Thoracic radiculitis  -     Ambulatory referral to PT/OT  -     gabapentin (NEURONTIN) 100 MG capsule; Take 300 mg by mouth 3 (three) times a day. Follow Gabapentin Dosing chart given  Dispense: 270 capsule; Refill: 3  -     HYDROcodone-acetaminophen 5-325 mg per tablet; Take 1 tablet by mouth 2 (two) times a day as needed for pain (Severe breakthrough pain).  Dispense: 8 tablet; Refill: 0    Thoracic degenerative disc disease  -     Ambulatory referral to PT/OT    Chronic bilateral thoracic back pain  -     Ambulatory referral to PT/OT  -     gabapentin (NEURONTIN) 100 MG capsule; Take 300 mg by mouth 3 (three) times a day. Follow Gabapentin Dosing chart given  Dispense: 270 capsule; Refill: 3    Chronic bilateral low back pain without sciatica  -     Ambulatory referral to PT/OT    Myofascial pain  -     Ambulatory referral to PT/OT       Aysha Gallagher is a 53 y.o. y.o. female patient last seen by LESLIE Woo for lumbar radiculitis 2017, with past medical history significant for migraine without aura, urolithiasis, who presents today for follow-up regarding:    -Chronic thoracic pain right greater than left with thoracic radiculitis.  Previous CT scan 2017 does show degenerative disc changes at the T11-12 level with possible disc bulge which could be contributing to pain.    -Chronic low back pain less intense than thoracic spine pain, no lumbar radicular symptoms.     -Mild lateral hip pain left greater than right consistent with mild greater trochanteric bursitis.     Patient is neurologically intact on exam.  No myelopathic or red flag symptoms.    EDMUNDO Score: 54     Plan:     A shared decision making plan was used. The patient's values and choices were respected. Prior medical records from 8/28/2017 to current were reviewed today. The following represents what was discussed and decided upon by the provider and the patient.         -DIAGNOSTIC TESTS: Images were personally reviewed and interpreted.   --Discussed that we could consider a thoracic spine MRI at any time.  Patient prefers to hold off which is reasonable as she is neurologically intact.  Discussed that if symptoms worsen at any time or not improving with physical therapy if this would be the next step in her plan which she is in agreements with.  If lumbar spine pain worsen down the road could consider lumbar spine MRI however her lower thoracic spine is most bothersome at this time.  --Reviewed abdominal/pelvic CT scan from 8/22/2017 which did show degenerative disc changes at T11-12 with possible disc bulge shadowing noted.  There is also lower lumbar facet arthropathy noted.    -INTERVENTIONS: Could consider thoracic right LAMBERT down the road pending MRI if symptoms are not improving.  Discussed this option with her in clinic today.    -MEDICATIONS: Prescribed gabapentin 100 mg to titrate up to 3 tablets 3 times daily as tolerated for thoracic radicular type pain, dosing chart given.  -Also prescribed hydrocodone 5/325 mg 1 tablet twice daily as needed for severe breakthrough pain, number 8 tablets given for 4 days worth.  This is for acute pain only.  Discussed side effects of medications and proper use. Patient verbalized understanding.    -PHYSICAL THERAPY: Referral to physical therapy [facility] placed today for thoracic radicular pain as well as myofascial pain and to establish core strengthening for thoracic spine region.  Discussed the importance of core strengthening, ROM, stretching exercises with the patient and how each of these entities is important in decreasing pain.  Explained to the patient that the purpose of physical therapy is to teach the patient a home exercise program.  These exercises need to be performed every day in order to decrease pain and prevent future occurrences of pain.        -PATIENT EDUCATION:  40 minutes of total visit time was  spent face to face with the patient today, 60 % of the visit was spent on counseling, education, and coordinating care.   -5 minutes spent outside of visit time, non-face-to-face time, reviewing chart.    -FOLLOW UP: Follow-up if symptoms are not improving or new symptoms arise at any time.  Advised to contact clinic if symptoms worsen or change.    Subjective:     Aysha Gallagher is a 53 y.o. female who presents today for follow-up regarding ongoing generalized thoracic spine pain that is worse at the lower thoracic region that radiates around to the lower abdomen at the level of the navel most significant on the right but does occasionally occur on the left, this is been intermittent however flareup more significant for the last couple of months.  Patient is very active with rowing and feels this has been aggravated lately, she is had intolerance to running as well recently.  Patient does endorse that today her pain is an 8/10, 9 at its worst, a 5 at its best.  Patient does endorse lower lumbar spine pain as well however this is less intense than her lower thoracic spine pain, she does have some pain rating to the lateral hips left greater than right that is more tolerable as this time as well.  Otherwise patient denies any lower extremity radiating pain, denies numbness or tingling sensations, denies weakness or recent trips or falls, denies bowel or bladder loss control.    Treatment to Date: No prior spinal surgery or spinal injection.  Tizanidine 4 mg  Flexeril 5 mg    Patient Active Problem List   Diagnosis     Urolithiasis     Breast lump on right side at 10 o'clock position     Acute left-sided low back pain without sciatica     Migraine without aura       Current Outpatient Medications on File Prior to Encounter   Medication Sig Dispense Refill     b complex vitamins tablet Take 1 tablet by mouth daily.       calcium citrate (CALCITRATE) 200 mg (950 mg) tablet Take 1 tablet by mouth daily.        CHOLECALCIFEROL, VITAMIN D3, (VITAMIN D3 ORAL) Take by mouth.       magnesium oxide 250 mg Tab Take by mouth.       NON FORMULARY HERBAL SUPPLEMENT FROM ACCUPUNCTURIST       OM-3/E/LINOL/ALA/OLEIC/GLA/LIP (OMEGA 3-6-9 ORAL) Take by mouth.       sertraline (ZOLOFT) 50 MG tablet TAKE 1 TABLET(50 MG) BY MOUTH DAILY 30 tablet 2     SUMAtriptan (IMITREX) 25 MG tablet Take 50 mg by mouth every 2 (two) hours as needed for migraine.       cyclobenzaprine (FLEXERIL) 5 MG tablet Take 5 mg by mouth 3 (three) times a day as needed for muscle spasms.       LORazepam (ATIVAN) 0.5 MG tablet Take 1 tablet (0.5 mg total) by mouth every 8 (eight) hours as needed for anxiety. 30 tablet 0     tiZANidine (ZANAFLEX) 4 MG tablet TK SS T PO QHS THEN INCREASE IN SS T INCREMENTS TO 4 TS D AS TOLERATED  2     traZODone (DESYREL) 50 MG tablet Take 1 tablet (50 mg total) by mouth at bedtime. 30 tablet 1     [DISCONTINUED] HYDROcodone-acetaminophen 5-325 mg per tablet Take 1 tablet by mouth every 4 (four) hours as needed for pain.       No current facility-administered medications on file prior to encounter.        No Known Allergies    Past Medical History:   Diagnosis Date     Depression      Kidney stone     2016     Lactose intolerance      Migraine         Review of Systems  ROS: Positive for muscle pain.  Specifically negative for bowel/bladder dysfunction, balance changes, headache, dizziness, foot drop, fevers, chills, appetite changes, nausea/vomiting, unexplained weight loss. Otherwise 13 systems reviewed are negative. Please see the patient's intake questionnaire from today for details.    Reviewed Social, Family, Past Medical and Past Surgical history with patient, no significant changes noted since prior visit.     Objective:     /60 (Patient Site: Right Arm, Patient Position: Sitting)   Pulse 64   Wt 138 lb (62.6 kg)   SpO2 100%   BMI 20.38 kg/m      PHYSICAL EXAMINATION:    --CONSTITUTIONAL: Well developed, well  nourished, healthy appearing individual.  --PSYCHIATRIC: Appropriate mood and affect. No difficulty interacting due to temper, social withdrawal, or memory issues.  --SKIN: Lumbar region is dry and intact.   --RESPIRATORY: Normal rhythm and effort. No abnormal accessory muscle breathing patterns noted.   --MUSCULOSKELETAL:  Normal lumbar lordosis noted, no lateral shift.  --GROSS MOTOR: Rises from seated position with some difficulty and hesitancy due to pain.  Gait is non-antalgic  --LUMBAR SPINE:  Inspection reveals no evidence of deformity. Range of motion is limited in all movements specifically with forward flexion with rounding the spine and lumbar extension.  Tenderness to palpation midline thoracic spine T2-T7 as well as T9-T12 region, minimal tenderness lumbar spine. Straight leg raising in the seated position is negative to radicular pain. Sciatic notch non-tender.   --SACROILIAC JOINT: One Finger point test negative.  --LOWER EXTREMITY MOTOR TESTING:  Plantar flexion left 5/5, right 5/5   Dorsiflexion left 5/5, right 5/5   Great toe MTP extension left 5/5, right 5/5  Knee flexion left 5/5, right 5/5  Knee extension left 5/5, right 5/5   Hip flexion left 5/5, right 5/5  Hip abduction left 5/5, right 5/5  Hip adduction left 5/5, right 5/5   --HIPS: Full range of motion bilaterally.   --NEUROLOGIC: Bilateral patellar and achilles reflexes are physiologic and symmetric. Sensation to light touch is intact in the bilateral L4, L5, and S1 dermatomes.    CERVICAL:   --HEENT:  Sclera are non-injected.  Extraocular muscles are intact.  Thyroid moves easily upon swallowing.  Moist oral mucosa.  --SKIN:  Skin over the face, bilateral upper extremities, and posterior torso is clean, dry, intact without rashes.  --UPPER EXTREMITY MOTOR TESTING:  Wrist flexion left 5/5, right 5/5  Wrist extension left 5/5, right 5/5  Pronators left 5/5, right 5/5  Biceps left 5/5, right 5/5   Triceps left 5/5, right 5/5   Shoulder  abduction left 5/5, right 5/5   left 5/5, right 5/5  --NEUROLOGIC:  CN III-XII are grossly intact.  Bilateral patellar and achilles reflexes are physiologic and symmetric. 2/4 symmetric biceps, brachioradialis, triceps reflexes bilaterally.  Sensation to upper extremities is intact.  Negative Motta's bilaterally.    --VASCULAR:  2/4 radial pulses bilaterally.  Warm upper limbs bilaterally.  Capillary refill in the upper extremities is less than 1 second. No obvious lower extremity swelling or varicosities.   --CERVICAL SPINE: Inspection reveals no evidence of deformity. No tenderness to palpation cervical spine.      RESULTS:   Imaging: Lumbar spine imaging was reviewed today. The images were shown to the patient and the findings were explained using a spine model.      CT ABDOMEN PELVIS WO ORAL WO IV CONTRAST  8/22/2017   INDICATION: Flank pain, stone known or suspected  TECHNIQUE: Routine CT abdomen and pelvis without oral or IV contrast. Multiplanar reformation images (MPR). Dose reduction techniques were used.  COMPARISON: 9/23/2016 CT.  FINDINGS:  LUNG BASES: Negative.  ABDOMEN: No renal or ureteral stones. No hydronephrosis. Unenhanced liver, spleen, adrenals, gallbladder, and pancreas appear normal.  PELVIS: Moderate amount stool in colon. Normal-appearing small bowel.  MUSCULOSKELETAL: Mild lower lumbar facet arthropathy.  CONCLUSION:  1.  No renal or ureteral stone seen.

## 2021-05-30 NOTE — PROGRESS NOTES
"[facility] Daily Progress     Patient Name: Aysha Raya-Otremba  Date: 2019  Visit #2/16  Referral Diagnosis:   Thoracic radiculitis   Thoracic degenerative disc disease   Chronic bilateral thoracic back pain   Chronic bilateral low back pain without sciatica   Myofascial pain   Referring provider: Amanda Sandhu C*  Visit Diagnosis:     ICD-10-CM    1. Pain in thoracic spine M54.6    2. Chronic bilateral low back pain without sciatica M54.5     G89.29    3. Rib pain R07.81          Assessment:     Patient is appropriate to continue with skilled physical therapy intervention, as indicated by initial plan of care.    Goal Status:  Pt. will demonstrate/verbalize independence in self-management of condition in : 12 weeks  Pt. will be independent with home exercise program in : 12 weeks  Pt. will report decreased intensity, frequency of : Pain;in 12 weeks;Comment  Comment:: decrease thoracic pain by 25% or better  Pt. will have improved quality of sleep: waking less times/night;in 12 weeks;Comment  Comment:: waking 0-1x /night  Pt. will be able to walk : 60 minutes;with less pain;with less difficulty;for community mobility;for exercise/recreation;in 12 weeks  Patient will stand : 60 minutes;with less difficultty;with less pain;for home chores;in 12 weeks  Pt. will bend: to dress;to clean;with less pain;with less difficulty;for self care;for exercise/recreation;in 12 weeks  Patient will twist/turn : in bed;while standing;with less difficulty;with less pain;for bed mobilitiy;in 12 weeks  Patient will return to: sport;exercise;in 12 weeks    No data recorded    Plan / Patient Education:     Continue with initial plan of care.  Progress with home program as tolerated.    Subjective:     Pain Ratin  Today \"has been a good day\". Dull/achy pain vs. Sharp pain.       Objective:     Cranial scan is (+) for (L) thoracic myochains.  Mult tender points in (B) rib cage/ant axillary line.   Decreased " fascial mobility of rib cage, thorax, and associated structures.     Treatment Today     TREATMENT MINUTES COMMENTS   Evaluation     Self-care/ Home management     Manual therapy 45 Induction, indirect, direct techniques utilized as appropriate for optimal tissue release.   MFR/SCS - (L) AR5/7/8-MS, respiratory diaphragm, (B) rot rib cage release, (B) diagonal ant thoracic release, liver,    Neuromuscular Re-education     Therapeutic Activity     Therapeutic Exercises 10 Exercises per flow sheet.   Instructed in new exercises.    Gait training     Modality__________________                Total 55    Blank areas are intentional and mean the treatment did not include these items.       Vaishali Gallego  7/30/2019

## 2021-05-30 NOTE — PATIENT INSTRUCTIONS - HE
Discussed the importance of core strengthening, ROM, stretching exercises with the patient and how each of these entities is important in decreasing pain.  Explained to the patient that the purpose of physical therapy is to teach the patient a home exercise program.  These exercises need to be performed every day in order to decrease pain and prevent future occurrences of pain.        ~Please call Nurse Navigation line [phone] with any questions or concerns about your treatment plan, if symptoms worsen and you would like to be seen urgently, or if you have problems controlling bladder and bowel function.  ~Follow Up Appointment time slots with Amanda Sandhu CNP with [facility], are also available at the [facility] location near [facility] on the first and third THURSDAY afternoons of each month.    Prescribed Gabapentin today, 100 mg tablets, to be titrated up to 3 tablets 3 times a day as tolerated for your nerve pain. Please follow Gabapentin dosing chart below.    Gabapentin 100mg Dosing Chart    DATE  MORNING AFTERNOON BEDTIME    Day 1 0 0 1    Day 2 0 0 1    Day 3 0 0 1    Day 4 1 0 1    Day 5 1 0 1    Day 6 1 0 1    Day 7 1 1 1    Day 8 1 1 1    Day 9 1 1 1    Day 10 1 1 2    Day 11 1 1 2    Day 12 1 1 2    Day 13 2 1 2    Day 14 2 1 2    Day 15 2 1 2    Day 16 2 2 2    Day 17 2 2 2    Day 18 2 2 2    Day 19 2 2 3    Day 20 2 2 3    Day 21 2 2 3    Day 22 3 2 3    Day 23 3 2 3    Day 24 3 2 3    Day 25 3 3 3    Day 26 3 3 3    Day 27 3 3 3     Continue medication, taking 3 capsules three times daily    Please call if you have any questions regarding how to take your medication

## 2021-05-30 NOTE — PROGRESS NOTES
[facility]   [place]-P[facility] Initial Evaluation    Patient Name: Aysha Gallagher  Date of evaluation: 7/19/2019  Visit #1  Referral Diagnosis:   Thoracic radiculitis   Thoracic degenerative disc disease   Chronic bilateral thoracic back pain   Chronic bilateral low back pain without sciatica   Myofascial pain   Referring provider: Amanda Sandhu C*  Visit Diagnosis:     ICD-10-CM    1. Pain in thoracic spine M54.6    2. Chronic bilateral low back pain without sciatica M54.5     G89.29    3. Rib pain R07.81        Assessment:   Aysha Gallagher is a 53 y.o. female who presents to therapy today with chief complaints of thoracic pain, rib pain, low back pain. Onset date of sx was 2018.  Functional impairments include standing, walking, sitting, twisting, sleeping, bending, lifting, exercise, coughing, sneezing .  Clinical findings include guarded posture with transfers, decreased trunk ROM with pain, multiple tender points in ant/lat/post rib cage, (B) thoracic spine, SI joints.        Pt. is appropriate for skilled PT intervention as outlined in the Plan of Care (POC).    Goals:  Pt. will demonstrate/verbalize independence in self-management of condition in : 12 weeks  Pt. will be independent with home exercise program in : 12 weeks  Pt. will report decreased intensity, frequency of : Pain;in 12 weeks;Comment  Comment:: decrease thoracic pain by 25% or better  Pt. will have improved quality of sleep: waking less times/night;in 12 weeks;Comment  Comment:: waking 0-1x /night  Pt. will be able to walk : 60 minutes;with less pain;with less difficulty;for community mobility;for exercise/recreation;in 12 weeks  Patient will stand : 60 minutes;with less difficultty;with less pain;for home chores;in 12 weeks  Pt. will bend: to dress;to clean;with less pain;with less difficulty;for self care;for exercise/recreation;in 12 weeks  Patient will twist/turn : in bed;while standing;with less  difficulty;with less pain;for bed mobilitiy;in 12 weeks  Patient will return to: sport;exercise;in 12 weeks    No data recorded    Patient's expectations/goals are realistic.    Barriers to Learning or Achieving Goals:  No Barriers.       Plan / Patient Instructions:        Plan of Care:   Authorization / Certification Number of Visits: pref one  Communication with: Referral Source  Patient Related Instruction: Nature of Condition;Treatment plan and rationale;Self Care instruction;Basis of treatment;Body mechanics;Posture;Next steps  Times per Week: 1-2  Number of Weeks: 6-8  Number of Visits: up to 16  Discharge Planning: HEP, self management  Precautions / Restrictions : none  Therapeutic Exercise: ROM;Stretching;Strengthening  Neuromuscular Reeducation: posture;kinesio tape;core  Manual Therapy: strain counterstrain;myofascial release      POC and pathology of condition were reviewed with patient.  Pt. is in agreement with the Plan of Care    Plan for next visit: manual therapy, initiate home program     Subjective:         Social information:   Living Situation:   Occupation:   Work Status:Working full time   Equipment Available: None    History of Present Illness:    Aysha is a 53 y.o. female who presents to therapy today with complaints of (B) mid to low back pain. (L) lat hip pain, radiating pain in rib cage. Describes nerve pain. Date of onset/duration of symptoms is , worsening with rowing activities. Fell / when moving to sit down, missed the chair. Onset was gradual. Symptoms are constant and getting worse. She denies history of similar symptoms. She describes their previous level of function as not limited  She has been rowing for the last 5 years, doing competitions. Previous PT for low back. In the past, she has improved with rest/not rowing. Sx have not improved since last fall. She does some stretching and yoga.     Pain Ratin  Pain rating at best: 5  Pain rating at  worst: 8-9  Pain description: pain    Functional limitations are described as occurring with:   bending  lifting  sitting 30 min  sleeping  standing 10-15 min  transitional movements getting in  bed and chair, getting out of  bed and chair and bed mobility  walking 20 min  waking 2-3x/night  Coughing, sneezing, deep inhalation  Twisting  Pulling open doors    Patient reports benefit from:  movement or exercise , cold, massage    Past Medical History:   Diagnosis Date     Depression      Kidney stone     2016     Lactose intolerance      Migraine      Past Surgical History:   Procedure Laterality Date     BREAST BIOPSY Right 2015    benign     CYSTOSCOPY W/ URETERAL STENT PLACEMENT Right 9/30/2016    Procedure: CYSTOSCOPY RIGHT URETEROSCOPIC STONE EXTRACTION, STENT REMOVAL ;  Surgeon: Ruperto Martinez MD;  Location: [facility];  Service:      KIDNEY STONE SURGERY  09/2016     NO PAST SURGERIES       Patient Active Problem List   Diagnosis     Urolithiasis     Breast lump on right side at 10 o'clock position     Acute left-sided low back pain without sciatica     Migraine without aura          Objective:      Note: Items left blank indicates the item was not performed or not indicated at the time of the evaluation.    Patient Outcome Measures :    Neck Disability Score in %: 48     Scores range from 0-100%, where a score of 0% represents minimal pain and maximal function. The minmal clinically important difference is a score reduction of 10%.    Examination  1. Pain in thoracic spine     2. Chronic bilateral low back pain without sciatica     3. Rib pain       Precautions/Restrictions: None  Involved side: Bilateral  Posture Observation:      General sitting posture is  normal.  General standing posture is normal.  Shoulder/Thoracic complex: Moderate bilateral scapular protraction   Moderate bilateral scapular winging  Moderately decreased upper thoracic kyphosis  Moderately decreased mid thoracic  kyphosis  Lumbopelvic complex: Moderately decreased lumbar lordosis  Pelvic alignment: (R) PSIS post in standing    Lumbar ROM:    % of normal  Date:      *Indicate scale AROM AROM AROM   Lumbar Flexion 95% limited thoracic curve reversal     Lumbar Extension 70-75% ERP      Right Left Right Left Right Left   Lumbar Sidebending WNL 80-90% L>R LB       Lumbar Rotation         Thoracic Flexion      Thoracic Extension      Thoracic Sidebending         Thoracic Rotation WNL 80% (+)         Lower Extremity Strength:   NA today  Date:      LE strength/5 Right Left Right Left Right Left   Hip Flexion (L1-3)         Hip Extension (L5-S1)         Hip Abduction (L4-5)         Hip Adduction (L2-3)         Hip External Rotation         Hip Internal Rotation         Knee Extension (L3-4)         Knee Flexion         Ankle Dorsiflexion (L4-5)         Great Toe Extension (L5)         Ankle Plantar flexion (S1)         Abdominals        Sensation           Reflex Testing  Lumbar Dermatomes Right Left UE Reflexes Right Left   Iliac Crest and Groin (L1)   Biceps (C5-6)     Anterior Medial Thigh (L2)   Brachioradialis (C5-6)     Anterior Thigh, Medial Epicondyle Femur (L3)   Triceps (C7-8)     Lateral Thigh, Anterior Knee, Medial Leg/Malleolus (L4)   Ken s test     Lateral Leg, Dorsal Foot (L5)   LE Reflexes     Lateral Foot (S1)   Patellar (L3-4)     Posterior Leg (S2)   Achilles (S1-2)     Other:   Babinski Response         Lumbar Special Tests:     Lumbar Special Tests Right Left SI Tests Right  Left   Quadrant test   SI Compression     Straight leg raise   SI Distraction     Crossover response   POSH Test     Slump   Sacral Thrust     Sit-up test  FADIR     Trunk extensor endurance test  ABDIAZIZ     Prone instability test  Resisted Abduction     Pubic shotgun  Other:       LE Screen/flexibility:  NA today    Palpation: mod/major tenderness R>L mid/lower rib cage (post/ant/mid axillary lines), R>L LF, ALL, PLL thoracic  points/mult levels, (B) SI joints.       Passive Mobility - Joint Integrity:  NA today    Treatment Today     TREATMENT MINUTES COMMENTS   Evaluation 40    Self-care/ Home management     Manual therapy 10 Induction, indirect, direct techniques utilized as appropriate for optimal tissue release.   MFR/SCS - (B) LGI-LV, (B) MGI-LV, SALVADOR-LV, ST-LV   Neuromuscular Re-education     Therapeutic Activity     Therapeutic Exercises 8 Plan of care and goals developed in collaboration with patient.   Discussed findings, anatomy.  Verbal review of current exercises - chanel pose, upper back stretch, lat trunk stretches.   Pt education on fascial dysfunction, multisystem restrictions limiting ROM, function.    Gait training     Modality__________________                Total 58    Blank areas are intentional and mean the treatment did not include these items.     PT Evaluation Code: (Please list factors)  Patient History/Comorbidities: hx kidney stones,   Examination: 2  Clinical Presentation: evolving  Clinical Decision Making: low    Patient History/  Comorbidities Examination  (body structures and functions, activity limitations, and/or participation restrictions) Clinical Presentation Clinical Decision Making (Complexity)   No documented Comorbidities or personal factors 1-2 Elements Stable and/or uncomplicated Low   1-2 documented comorbidities or personal factor 3 Elements Evolving clinical presentation with changing characteristics Moderate   3-4 documented comorbidities or personal factors 4 or more Unstable and unpredictable High                Vaishali PABLO Julián  7/19/2019  10:20 AM

## 2021-05-31 VITALS — HEIGHT: 69 IN | WEIGHT: 138.1 LBS | BODY MASS INDEX: 20.45 KG/M2

## 2021-05-31 VITALS — HEIGHT: 69 IN | WEIGHT: 132.75 LBS | BODY MASS INDEX: 19.66 KG/M2

## 2021-05-31 VITALS — BODY MASS INDEX: 19.94 KG/M2 | WEIGHT: 135 LBS

## 2021-05-31 NOTE — PROGRESS NOTES
"[facility] Daily Progress     Patient Name: Aysha Ryaa-Otremba  Date: 8/20/2019  Visit #7/16  Referral Diagnosis:   Thoracic radiculitis   Thoracic degenerative disc disease   Chronic bilateral thoracic back pain   Chronic bilateral low back pain without sciatica   Myofascial pain   Referring provider: Amanda Sandhu C*  Visit Diagnosis:     ICD-10-CM    1. Pain in thoracic spine M54.6    2. Chronic bilateral low back pain without sciatica M54.5     G89.29    3. Rib pain R07.81          Assessment:     Patient demonstrates understanding/independence with home program.  Patient is appropriate to continue with skilled physical therapy intervention, as indicated by initial plan of care.    Goal Status:  Pt. will demonstrate/verbalize independence in self-management of condition in : 12 weeks;Progressing toward  Pt. will be independent with home exercise program in : 12 weeks;Progressing toward  Pt. will report decreased intensity, frequency of : Pain;in 12 weeks;Comment;Progressing toward  Comment:: decrease thoracic pain by 25% or better  Pt. will have improved quality of sleep: waking less times/night;in 12 weeks;Comment;Progressing toward  Comment:: waking 0-1x /night   status: \"not too bad the last couple nights\"  Pt. will be able to walk : 60 minutes;with less pain;with less difficulty;for community mobility;for exercise/recreation;in 12 weeks  Patient will stand : 60 minutes;with less difficultty;with less pain;for home chores;in 12 weeks;Progressing toward  Pt. will bend: to dress;to clean;with less pain;with less difficulty;for self care;for exercise/recreation;in 12 weeks;Progressing toward  Patient will twist/turn : in bed;while standing;with less difficulty;with less pain;for bed mobilitiy;in 12 weeks;Progressing toward  Patient will return to: sport;exercise;in 12 weeks;Not Met    No data recorded    Plan / Patient Education:     Continue with initial plan of care.  Progress with " home program as tolerated.    Subjective:     Pain Ratin  Sx have been up and down since last Thursday. Was still feeing sore from kayaking. Sleeping a little bit better over the weekend. Went for a long walk on , about 4 miles      Objective:     Mod/major fascial restrictions and associated tenderness of respiratory diaphragm, (R) medial scapula, (R) upper thorax and chest wall.     Treatment Today     TREATMENT MINUTES COMMENTS   Evaluation     Self-care/ Home management     Manual therapy 55 Induction, indirect, direct techniques utilized as appropriate for optimal tissue release.   MFR/SCS - (R) scapular release, thoracic inlet, (R) chest wall/upper thorax, respiratory diaphragm, pancreas,    Neuromuscular Re-education     Therapeutic Activity     Therapeutic Exercises     Gait training     Modality__________________                Total 55    Blank areas are intentional and mean the treatment did not include these items.       Vaishali Gallego  2019

## 2021-05-31 NOTE — TELEPHONE ENCOUNTER
Patient returning call. Informed her that the MRI orders had been placed for within [facility]. Scheduling number given for patient to call at a later time.

## 2021-05-31 NOTE — PROGRESS NOTES
[facility] Daily Progress     Patient Name: Aysha Raya-Otremba  Date: 2019  Visit #4/16  Referral Diagnosis:   Thoracic radiculitis   Thoracic degenerative disc disease   Chronic bilateral thoracic back pain   Chronic bilateral low back pain without sciatica   Myofascial pain   Referring provider: Amanda Sandhu C*  Visit Diagnosis:     ICD-10-CM    1. Pain in thoracic spine M54.6    2. Chronic bilateral low back pain without sciatica M54.5     G89.29    3. Rib pain R07.81          Assessment:     Patient is appropriate to continue with skilled physical therapy intervention, as indicated by initial plan of care.    Goal Status:  Pt. will demonstrate/verbalize independence in self-management of condition in : 12 weeks  Pt. will be independent with home exercise program in : 12 weeks  Pt. will report decreased intensity, frequency of : Pain;in 12 weeks;Comment  Comment:: decrease thoracic pain by 25% or better  Pt. will have improved quality of sleep: waking less times/night;in 12 weeks;Comment  Comment:: waking 0-1x /night  Pt. will be able to walk : 60 minutes;with less pain;with less difficulty;for community mobility;for exercise/recreation;in 12 weeks  Patient will stand : 60 minutes;with less difficultty;with less pain;for home chores;in 12 weeks  Pt. will bend: to dress;to clean;with less pain;with less difficulty;for self care;for exercise/recreation;in 12 weeks  Patient will twist/turn : in bed;while standing;with less difficulty;with less pain;for bed mobilitiy;in 12 weeks  Patient will return to: sport;exercise;in 12 weeks    No data recorded    Plan / Patient Education:     Continue with initial plan of care.  Progress with home program as tolerated.    Subjective:     Pain Ratin  Reports decreased pain the day after treatment. Less pain at night, upon waking. Low back is better. Primary complaint is (B) lower thoracic/upper lumbar pain that radiates to ant low rib cage.        Objective:     Cranial scan is (+) for (R) thoracic periosteum, (R) thoracic ligamentous.   Mult tender pts (R) thoracic spine and lower rib tubercles.   Mod fascial restrictions of liver, kidney, mesenteric root.    Treatment Today     TREATMENT MINUTES COMMENTS   Evaluation     Self-care/ Home management     Manual therapy 55 Induction, indirect, direct techniques utilized as appropriate for optimal tissue release.   MFR/SCS - (R) T8-12E(P), liver motility/mobility, respiratory diaphragm, (R) KI-V, (R) KS-V, mesenteric root, sm intestine, asc colon,    Neuromuscular Re-education     Therapeutic Activity     Therapeutic Exercises     Gait training     Modality__________________                Total 55    Blank areas are intentional and mean the treatment did not include these items.       Vaishali Gallego  8/8/2019

## 2021-05-31 NOTE — PROGRESS NOTES
"[facility] Daily Progress     Patient Name: Aysha Raya-Otremba  Date: 8/27/2019  Visit #9/16  Referral Diagnosis:   Thoracic radiculitis   Thoracic degenerative disc disease   Chronic bilateral thoracic back pain   Chronic bilateral low back pain without sciatica   Myofascial pain   Referring provider: Amanda Sandhu C*  Visit Diagnosis:     ICD-10-CM    1. Pain in thoracic spine M54.6    2. Chronic bilateral low back pain without sciatica M54.5     G89.29    3. Rib pain R07.81          Assessment:     HEP/POC compliance is  good .  Patient demonstrates understanding/independence with home program.  Patient is appropriate to continue with skilled physical therapy intervention, as indicated by initial plan of care.    Goal Status:  Pt. will demonstrate/verbalize independence in self-management of condition in : 12 weeks;Progressing toward  Pt. will be independent with home exercise program in : 12 weeks;Progressing toward  Pt. will report decreased intensity, frequency of : Pain;in 12 weeks;Comment;Progressing toward  Comment:: decrease thoracic pain by 25% or better  Pt. will have improved quality of sleep: waking less times/night;in 12 weeks;Comment;Progressing toward  Comment:: waking 0-1x /night   status: \"not too bad the last couple nights\"  Pt. will be able to walk : 60 minutes;with less pain;with less difficulty;for community mobility;for exercise/recreation;in 12 weeks  Patient will stand : 60 minutes;with less difficultty;with less pain;for home chores;in 12 weeks;Progressing toward  Pt. will bend: to dress;to clean;with less pain;with less difficulty;for self care;for exercise/recreation;in 12 weeks;Progressing toward  Patient will twist/turn : in bed;while standing;with less difficulty;with less pain;for bed mobilitiy;in 12 weeks;Progressing toward  Patient will return to: sport;exercise;in 12 weeks;Not Met    No data recorded    Plan / Patient Education:     Continue with initial " plan of care.  Progress with home program as tolerated.    Subjective:     Pain Ratin  Hasn't been sleeping well. Felt pretty good after the last session until she mowed the lawn. Continues to walk for exercise.       Objective:     Mod fascial restrictions of R>L thoracic inlet, (R) chest wall and rib cage, respiratory diaphragm,     Treatment Today   15 min late  TREATMENT MINUTES COMMENTS   Evaluation     Self-care/ Home management     Manual therapy 40 Induction, indirect, direct techniques utilized as appropriate for optimal tissue release.   MFR/SCS - (R) medial scapular release, thoracic yoke, (R) DCFA-MS, (R) DCFP-MS, (R) chest wall/upper thorax, respiratory diaphragm,    Neuromuscular Re-education     Therapeutic Activity     Therapeutic Exercises 5 nc Verbal review of stretching routine - Upper back stretch, child's pose, lumbar flexion, quad stretch, hip flexor stretch   Gait training     Modality__________________                Total 45    Blank areas are intentional and mean the treatment did not include these items.       Vaishali Gallego  2019

## 2021-05-31 NOTE — PROGRESS NOTES
"[facility] Daily Progress     Patient Name: Aysha Raya-Otremba  Date: 9/3/2019  Visit #11/16  Referral Diagnosis:   Thoracic radiculitis   Thoracic degenerative disc disease   Chronic bilateral thoracic back pain   Chronic bilateral low back pain without sciatica   Myofascial pain   Referring provider: Amanda Sandhu C*  Visit Diagnosis:     ICD-10-CM    1. Pain in thoracic spine M54.6    2. Chronic bilateral low back pain without sciatica M54.5     G89.29    3. Rib pain R07.81          Assessment:     HEP/POC compliance is  good .  Patient demonstrates understanding/independence with home program.  Patient is appropriate to continue with skilled physical therapy intervention, as indicated by initial plan of care.    Goal Status:  Pt. will demonstrate/verbalize independence in self-management of condition in : 12 weeks;Progressing toward  Pt. will be independent with home exercise program in : 12 weeks;Progressing toward  Pt. will report decreased intensity, frequency of : Pain;in 12 weeks;Comment;Progressing toward  Comment:: decrease thoracic pain by 25% or better  Pt. will have improved quality of sleep: waking less times/night;in 12 weeks;Comment;Progressing toward  Comment:: waking 0-1x /night   status: \"not too bad the last couple nights\"  Pt. will be able to walk : 60 minutes;with less pain;with less difficulty;for community mobility;for exercise/recreation;in 12 weeks  Patient will stand : 60 minutes;with less difficultty;with less pain;for home chores;in 12 weeks;Progressing toward  Pt. will bend: to dress;to clean;with less pain;with less difficulty;for self care;for exercise/recreation;in 12 weeks;Progressing toward  Patient will twist/turn : in bed;while standing;with less difficulty;with less pain;for bed mobilitiy;in 12 weeks;Progressing toward  Patient will return to: exercise;sport;in 12 weeks;Not Met    No data recorded    Plan / Patient Education:     Continue with initial " plan of care.  Progress with home program as tolerated.    Subjective:     Pain Ratin  Slept well last night. Didn't do too much strenuous activity over the weekend. She did some weeding and yard work without significant exacerbation. Had a massage last week. Scheduled for MRI this week. [place] pretty good after the last session.       Objective:     GL (+) for (R) upper thorax.   Mult tender points (R) lat and ant/lat mid to lower rib cage.   Fascial restrictions of upper thorax, rib cage, liver, diaphragms.     Treatment Today     TREATMENT MINUTES COMMENTS   Evaluation     Self-care/ Home management     Manual therapy 55 Induction, indirect, direct techniques utilized as appropriate for optimal tissue release.   MFR/SCS - (R) upper thorax, (R) rib cage release/unwinding, (R) pec minor, (R) lower ribs, liver, respiratory diaphragm, (R) lat intercostal nn/mult levels, (R) PER-V, (R) TMC-V, hepatic flexure,    Neuromuscular Re-education     Therapeutic Activity     Therapeutic Exercises     Gait training     Modality__________________                Total 55    Blank areas are intentional and mean the treatment did not include these items.       Vaishali Gallego  9/3/2019

## 2021-05-31 NOTE — PROGRESS NOTES
"[facility] Daily Progress     Patient Name: Aysha Raya-Otremba  Date: 8/29/2019  Visit #10/16  Referral Diagnosis:   Thoracic radiculitis   Thoracic degenerative disc disease   Chronic bilateral thoracic back pain   Chronic bilateral low back pain without sciatica   Myofascial pain   Referring provider: Amanda Sandhu C*  Visit Diagnosis:     ICD-10-CM    1. Pain in thoracic spine M54.6    2. Chronic bilateral low back pain without sciatica M54.5     G89.29    3. Rib pain R07.81          Assessment:     HEP/POC compliance is  good .  Patient demonstrates understanding/independence with home program.  Response to Intervention fair  Patient is appropriate to continue with skilled physical therapy intervention, as indicated by initial plan of care.    Goal Status:  Pt. will demonstrate/verbalize independence in self-management of condition in : 12 weeks;Progressing toward  Pt. will be independent with home exercise program in : 12 weeks;Progressing toward  Pt. will report decreased intensity, frequency of : Pain;in 12 weeks;Comment;Progressing toward  Comment:: decrease thoracic pain by 25% or better  Pt. will have improved quality of sleep: waking less times/night;in 12 weeks;Comment;Progressing toward  Comment:: waking 0-1x /night   status: \"not too bad the last couple nights\"  Pt. will be able to walk : 60 minutes;with less pain;with less difficulty;for community mobility;for exercise/recreation;in 12 weeks  Patient will stand : 60 minutes;with less difficultty;with less pain;for home chores;in 12 weeks;Progressing toward  Pt. will bend: to dress;to clean;with less pain;with less difficulty;for self care;for exercise/recreation;in 12 weeks;Progressing toward  Patient will twist/turn : in bed;while standing;with less difficulty;with less pain;for bed mobilitiy;in 12 weeks;Progressing toward  Patient will return to: sport;exercise;in 12 weeks;Not Met    No data recorded    Plan / Patient " Education:     Continue with initial plan of care.  Progress with home program as tolerated.    Subjective:     Pain Ratin  Last couple days hasn't been good. Pain has been more intense. (R) lower thoracic to upper lumbar pain, pain in upper thoracic area. Called to request MRI.   She is noticing some positive changes after treatment, but it doesn't seem to be holding.    Objective:     Mod fascial restrictions and associated tender points of (R) upper thorax, (R) post/lat mid/lower ribs, (R) post R12, transverse diaphragms, mesenteric root. (R) intercostal spaces,     Treatment Today     TREATMENT MINUTES COMMENTS   Evaluation     Self-care/ Home management     Manual therapy 55 Induction, indirect, direct techniques utilized as appropriate for optimal tissue release.   MFR/SCS - thoracic inlet, respiratory diaphragm, (R) lat intercostal nn/mult levels, (R) LGI-LV, (R) MGI-LV, SALVADOR-LV, (R) MR-V, mesenteric root,    Neuromuscular Re-education     Therapeutic Activity     Therapeutic Exercises     Gait training     Modality__________________                Total 55    Blank areas are intentional and mean the treatment did not include these items.       Vaishali Gallego  2019

## 2021-05-31 NOTE — PROGRESS NOTES
"[facility] Daily Progress     Patient Name: Aysha Raya-Otremba  Date: 8/15/2019  Visit #6/16  Referral Diagnosis:   Thoracic radiculitis   Thoracic degenerative disc disease   Chronic bilateral thoracic back pain   Chronic bilateral low back pain without sciatica   Myofascial pain   Referring provider: Amanda Sandhu C*  Visit Diagnosis:     ICD-10-CM    1. Pain in thoracic spine M54.6    2. Chronic bilateral low back pain without sciatica M54.5     G89.29    3. Rib pain R07.81          Assessment:     Response to Intervention fair  Patient is benefitting from skilled physical therapy and is making steady progress toward functional goals.  Patient is appropriate to continue with skilled physical therapy intervention, as indicated by initial plan of care.    Goal Status:  Pt. will demonstrate/verbalize independence in self-management of condition in : 12 weeks;Progressing toward  Pt. will be independent with home exercise program in : 12 weeks;Progressing toward  Pt. will report decreased intensity, frequency of : Pain;in 12 weeks;Comment;Progressing toward  Comment:: decrease thoracic pain by 25% or better  Pt. will have improved quality of sleep: waking less times/night;in 12 weeks;Comment;Progressing toward  Comment:: waking 0-1x /night   status: \"not too bad the last couple nights\"  Pt. will be able to walk : 60 minutes;with less pain;with less difficulty;for community mobility;for exercise/recreation;in 12 weeks  Patient will stand : 60 minutes;with less difficultty;with less pain;for home chores;in 12 weeks;Progressing toward  Pt. will bend: to dress;to clean;with less pain;with less difficulty;for self care;for exercise/recreation;in 12 weeks;Progressing toward  Patient will twist/turn : in bed;while standing;with less difficulty;with less pain;for bed mobilitiy;in 12 weeks;Progressing toward  Patient will return to: sport;exercise;in 12 weeks;Not Met    No data recorded    Plan / " "Patient Education:     Continue with initial plan of care.  Progress with home program as tolerated.    Subjective:     Pain Ratin  Was sore after the last session. It calmed down after a little while. \"Still not great\". Went kayaking a little bit, with pain level up to 8/10. Has slept pretty well the last couple nights. Hasn't been able to contact Spine clinic about MRI.    Objective:     Fascial restrictions and ST tension noted in R>L lumbar paraspinals.   Fascial restrictions noted in diaphragms, (R) thorax, upper abdominal viscera.     Treatment Today     TREATMENT MINUTES COMMENTS   Evaluation     Self-care/ Home management     Manual therapy 55 Induction, indirect, direct techniques utilized as appropriate for optimal tissue release.   MFR/SCS - (R) rot rib cage release, respiratory diaphragm, liver mobility, thoracic inlet, (R) upper thorax/chest wall, (R) pleura, (R) kidney,    Neuromuscular Re-education 5 nc KT - (B) erector spinae. Patient educated in tape wear and issued handout.    Therapeutic Activity     Therapeutic Exercises     Gait training     Modality__________________                Total 60    Blank areas are intentional and mean the treatment did not include these items.       Vaishali Gallego  8/15/2019    "

## 2021-05-31 NOTE — TELEPHONE ENCOUNTER
Called patient to inform orders have been placed. To be completed ay [facility]. Left message to return call.

## 2021-05-31 NOTE — PROGRESS NOTES
[facility] Daily Progress     Patient Name: Aysha Raya-Otremba  Date: 2019  Visit #5/16  Referral Diagnosis:   Thoracic radiculitis   Thoracic degenerative disc disease   Chronic bilateral thoracic back pain   Chronic bilateral low back pain without sciatica   Myofascial pain   Referring provider: Amanda Sandhu C*  Visit Diagnosis:     ICD-10-CM    1. Pain in thoracic spine M54.6    2. Chronic bilateral low back pain without sciatica M54.5     G89.29    3. Rib pain R07.81          Assessment:     Patient is appropriate to continue with skilled physical therapy intervention, as indicated by initial plan of care.    Goal Status:  Pt. will demonstrate/verbalize independence in self-management of condition in : 12 weeks  Pt. will be independent with home exercise program in : 12 weeks  Pt. will report decreased intensity, frequency of : Pain;in 12 weeks;Comment  Comment:: decrease thoracic pain by 25% or better  Pt. will have improved quality of sleep: waking less times/night;in 12 weeks;Comment  Comment:: waking 0-1x /night  Pt. will be able to walk : 60 minutes;with less pain;with less difficulty;for community mobility;for exercise/recreation;in 12 weeks  Patient will stand : 60 minutes;with less difficultty;with less pain;for home chores;in 12 weeks  Pt. will bend: to dress;to clean;with less pain;with less difficulty;for self care;for exercise/recreation;in 12 weeks  Patient will twist/turn : in bed;while standing;with less difficulty;with less pain;for bed mobilitiy;in 12 weeks  Patient will return to: sport;exercise;in 12 weeks    No data recorded    Plan / Patient Education:     Continue with initial plan of care.  Progress with home program as tolerated.   Patient to contact spine clinic re:MRI order.     Subjective:     Pain Ratin  Reported some relief after the last session. Less constant post/lat lower thoracic pain. Pain increased with working in the garden. Rotation,  lifting increases sx. Seems to be flaring up more easily. Low back went out again.  Is considering asking for MRI.     Objective:     Cranial scan is (+) for (L) lumbar/(R) thoracic periosteal, (L) upper thoracic visceral,   Tender pts post> ant (L) upper thorax, (R) post rib angles/mult levels, (L) lumbar spine, .  Fascial restrictions of lung, kidney, colon.     Treatment Today     TREATMENT MINUTES COMMENTS   Evaluation     Self-care/ Home management     Manual therapy 55 Induction, indirect, direct techniques utilized as appropriate for optimal tissue release.   MFR/SCS - (R) PDP-V, (R) SSPL-V, (R) CPP4-V, (R) MED-V, (R) R2-3S(P), (R) R10-11S(P), (L) L2-5E (P), respiratory diaphragm, (L) kidney, desc colon,    Neuromuscular Re-education     Therapeutic Activity     Therapeutic Exercises     Gait training     Modality__________________                Total 55    Blank areas are intentional and mean the treatment did not include these items.       Vaishali Gallego  8/13/2019

## 2021-05-31 NOTE — PROGRESS NOTES
"[facility] Daily Progress     Patient Name: Aysha Raya-Otremba  Date: 8/22/2019  Visit #8/16  Referral Diagnosis:   Thoracic radiculitis   Thoracic degenerative disc disease   Chronic bilateral thoracic back pain   Chronic bilateral low back pain without sciatica   Myofascial pain   Referring provider: Amanda Sandhu C*  Visit Diagnosis:     ICD-10-CM    1. Pain in thoracic spine M54.6    2. Chronic bilateral low back pain without sciatica M54.5     G89.29    3. Rib pain R07.81          Assessment:     HEP/POC compliance is  good .  Patient demonstrates understanding/independence with home program.  Patient is appropriate to continue with skilled physical therapy intervention, as indicated by initial plan of care.    Goal Status:  Pt. will demonstrate/verbalize independence in self-management of condition in : 12 weeks;Progressing toward  Pt. will be independent with home exercise program in : 12 weeks;Progressing toward  Pt. will report decreased intensity, frequency of : Pain;in 12 weeks;Comment;Progressing toward  Comment:: decrease thoracic pain by 25% or better  Pt. will have improved quality of sleep: waking less times/night;in 12 weeks;Comment;Progressing toward  Comment:: waking 0-1x /night   status: \"not too bad the last couple nights\"  Pt. will be able to walk : 60 minutes;with less pain;with less difficulty;for community mobility;for exercise/recreation;in 12 weeks  Patient will stand : 60 minutes;with less difficultty;with less pain;for home chores;in 12 weeks;Progressing toward  Pt. will bend: to dress;to clean;with less pain;with less difficulty;for self care;for exercise/recreation;in 12 weeks;Progressing toward  Patient will twist/turn : in bed;while standing;with less difficulty;with less pain;for bed mobilitiy;in 12 weeks;Progressing toward  Patient will return to: sport;exercise;in 12 weeks;Not Met    No data recorded    Plan / Patient Education:     Continue with initial " plan of care.  Progress with home program as tolerated.    Subjective:     Pain Ratin-6  A little more painful this morning, didn't sleep as well last night. (L) neck/upper back, (R) mid/low back pain. Felt pretty good after the last session, no flare ups. Went for a vigorous 30 min walk yesterday. Hasn't had time to ask about MRI orders.     Objective:     Mod/mjaor fascial restrictions of thoracic inlet, upper thorax (B), kidneys, respiratory diaphragm, sternum, mediastinum.   Fascial restrictions are slow to release. Limited tolerance for deeper tissue release in previous sessions.     Treatment Today     TREATMENT MINUTES COMMENTS   Evaluation     Self-care/ Home management     Manual therapy 55 Induction, indirect, direct techniques utilized as appropriate for optimal tissue release.   MFR/SCS - thoracic inlet, (B) upper thorax/upper chest wall, mediastinum, sternum, respiratory diaphragm, (B) kidney   Neuromuscular Re-education     Therapeutic Activity     Therapeutic Exercises     Gait training     Modality__________________                Total 55    Blank areas are intentional and mean the treatment did not include these items.       Vaishali Gallego  2019

## 2021-05-31 NOTE — PROGRESS NOTES
"[facility] Daily Progress     Patient Name: Aysha Raya-Otremba  Date: 2019  Visit #3/16  Referral Diagnosis:   Thoracic radiculitis   Thoracic degenerative disc disease   Chronic bilateral thoracic back pain   Chronic bilateral low back pain without sciatica   Myofascial pain   Referring provider: Amanda Snadhu C*  Visit Diagnosis:     ICD-10-CM    1. Pain in thoracic spine M54.6    2. Chronic bilateral low back pain without sciatica M54.5     G89.29    3. Rib pain R07.81          Assessment:     HEP/POC compliance is  good .  Patient demonstrates understanding/independence with home program.  Patient is appropriate to continue with skilled physical therapy intervention, as indicated by initial plan of care.    Goal Status:  Pt. will demonstrate/verbalize independence in self-management of condition in : 12 weeks  Pt. will be independent with home exercise program in : 12 weeks  Pt. will report decreased intensity, frequency of : Pain;in 12 weeks;Comment  Comment:: decrease thoracic pain by 25% or better  Pt. will have improved quality of sleep: waking less times/night;in 12 weeks;Comment  Comment:: waking 0-1x /night  Pt. will be able to walk : 60 minutes;with less pain;with less difficulty;for community mobility;for exercise/recreation;in 12 weeks  Patient will stand : 60 minutes;with less difficultty;with less pain;for home chores;in 12 weeks  Pt. will bend: to dress;to clean;with less pain;with less difficulty;for self care;for exercise/recreation;in 12 weeks  Patient will twist/turn : in bed;while standing;with less difficulty;with less pain;for bed mobilitiy;in 12 weeks  Patient will return to: sport;exercise;in 12 weeks    No data recorded    Plan / Patient Education:     Continue with initial plan of care.  Progress with home program as tolerated.    Subjective:     Pain Ratin  \"not good\". She was able to do some hiking while out of town, but strained her low back with " bending forward. (R) thoracic pain disrupting sleep. Exercises generally going okay. Rotation stretch is the most difficult.    Objective:     Cranial scan is (+) for L>R thoracic periosteum.  GL (+) for (R) lower rib cage/thorax    Treatment Today     TREATMENT MINUTES COMMENTS   Evaluation     Self-care/ Home management     Manual therapy 55 Induction, indirect, direct techniques utilized as appropriate for optimal tissue release.   MFR/SCS - (L) T5-10E(P), thoracic inlet, mediastinum, (R) lower rib cage release, liver mobility/motility, (B) lung/upper lobe, pec release   Neuromuscular Re-education     Therapeutic Activity     Therapeutic Exercises     Gait training     Modality__________________                Total 55    Blank areas are intentional and mean the treatment did not include these items.       Vaishali Gallego  8/6/2019

## 2021-06-01 NOTE — PROGRESS NOTES
"[facility] Daily Progress     Patient Name: Aysha Raya-Otremba  Date: 9/24/2019  Visit #15/16  Referral Diagnosis:   Thoracic radiculitis   Thoracic degenerative disc disease   Chronic bilateral thoracic back pain   Chronic bilateral low back pain without sciatica   Myofascial pain   Referring provider: Amanda Sandhu C*  Visit Diagnosis:     ICD-10-CM    1. Pain in thoracic spine M54.6    2. Chronic bilateral low back pain without sciatica M54.5     G89.29    3. Rib pain R07.81          Assessment:     Patient demonstrates understanding/independence with home program.  Patient is appropriate to continue with skilled physical therapy intervention, as indicated by initial plan of care.    Goal Status:  Pt. will demonstrate/verbalize independence in self-management of condition in : 12 weeks;Progressing toward  Pt. will be independent with home exercise program in : 12 weeks;Progressing toward  Pt. will report decreased intensity, frequency of : Pain;in 12 weeks;Comment;Progressing toward  Comment:: decrease thoracic pain by 25% or better  Pt. will have improved quality of sleep: waking less times/night;in 12 weeks;Comment;Progressing toward  Comment:: waking 0-1x /night   status: \"not too bad the last couple nights\"  Pt. will be able to walk : 60 minutes;with less pain;with less difficulty;for community mobility;for exercise/recreation;in 12 weeks  Patient will stand : 60 minutes;with less difficultty;with less pain;for home chores;in 12 weeks;Progressing toward  Pt. will bend: to dress;to clean;with less pain;with less difficulty;for self care;for exercise/recreation;in 12 weeks;Progressing toward  Patient will twist/turn : in bed;while standing;with less difficulty;with less pain;for bed mobilitiy;in 12 weeks;Progressing toward  Patient will return to: exercise;sport;in 12 weeks;Not Met    No data recorded    Plan / Patient Education:     Continue with initial plan of care.  Progress with " home program as tolerated.  consider trial of reformer exercise.   Initial order/POC of 16 visits will be completed next visit. Will need to update POC.  Patient to schedule with Spine care to follow up on MRI results.     Subjective:     Pain Ratin  Still feeling sick. Had e-visit, likely sinus infection. Initially sore after the last session, but things settled down afterwards. Sx are easily aggravated with yard work etc. Notes overall improvement in pain in the morning since the start of care.       Objective:     Fascial restrictions of respiratory diaphragm, (R) thoracic paraspinals, (R) intercostal nn    Treatment Today     TREATMENT MINUTES COMMENTS   Evaluation     Self-care/ Home management     Manual therapy 55 Induction, indirect, direct techniques utilized as appropriate for optimal tissue release.   MFR/SCS - respiratory diaphragm, (R) hemidiaphragm, (R) lat intercostal nn/mult levels, (R) [id number], (R) thoracic paraspinals, (R) T9-10F(P),   Mobilization - (R) costovertebral jt T7-12 gr. I-II ocsill in (L) SL   Neuromuscular Re-education     Therapeutic Activity     Therapeutic Exercises     Gait training     Modality__________________                Total 55    Blank areas are intentional and mean the treatment did not include these items.       Vaishali Gallego  2019

## 2021-06-01 NOTE — TELEPHONE ENCOUNTER
Pt returned call. Provider's results and recommendations given. Pt stated understanding. Pt would prefer to follow-up with provider in clinic first to review these results. Pt will call back to schedule follow-up appt.

## 2021-06-01 NOTE — PROGRESS NOTES
"[facility] Daily Progress     Patient Name: Aysha Raya-Otremba  Date: 9/20/2019  Visit #14/16  Referral Diagnosis:   Thoracic radiculitis   Thoracic degenerative disc disease   Chronic bilateral thoracic back pain   Chronic bilateral low back pain without sciatica   Myofascial pain   Referring provider: Amanda Sandhu C*  Visit Diagnosis:     ICD-10-CM    1. Pain in thoracic spine M54.6    2. Chronic bilateral low back pain without sciatica M54.5     G89.29    3. Rib pain R07.81          Assessment:     Response to Intervention fair  Patient is appropriate to continue with skilled physical therapy intervention, as indicated by initial plan of care.    Goal Status:  Pt. will demonstrate/verbalize independence in self-management of condition in : 12 weeks;Progressing toward  Pt. will be independent with home exercise program in : 12 weeks;Progressing toward  Pt. will report decreased intensity, frequency of : Pain;in 12 weeks;Comment;Progressing toward  Comment:: decrease thoracic pain by 25% or better  Pt. will have improved quality of sleep: waking less times/night;in 12 weeks;Comment;Progressing toward  Comment:: waking 0-1x /night   status: \"not too bad the last couple nights\"  Pt. will be able to walk : 60 minutes;with less pain;with less difficulty;for community mobility;for exercise/recreation;in 12 weeks  Patient will stand : 60 minutes;with less difficultty;with less pain;for home chores;in 12 weeks;Progressing toward  Pt. will bend: to dress;to clean;with less pain;with less difficulty;for self care;for exercise/recreation;in 12 weeks;Progressing toward  Patient will twist/turn : in bed;while standing;with less difficulty;with less pain;for bed mobilitiy;in 12 weeks;Progressing toward  Patient will return to: exercise;sport;in 12 weeks;Not Met    No data recorded    Plan / Patient Education:     Continue with initial plan of care.  Progress with home program as tolerated.  consider " trial of reformer exercise.     Subjective:     Pain Ratin-5  Has been sick all week. Had to miss last appointment. Has not been able to schedule with spine care. Was very sore after being on her feet all day yesterday.  Felt some benefit in combination with massage after the last session.     Objective:     Cranial scan is (+) for (R) thoracic ALL, PLL.   Fascial restrictions of rib cage, TL junction ligaments, respiratory diaphragm     Treatment Today     TREATMENT MINUTES COMMENTS   Evaluation     Self-care/ Home management     Manual therapy 55 Induction, indirect, direct techniques utilized as appropriate for optimal tissue release.   MFR/SCS - (R) thoracic/rib cage release, (R) PLLT1-12-MS, (R) [id number], (R) kidney, asc colon, liver, respiratory diaphragm,    Neuromuscular Re-education     Therapeutic Activity     Therapeutic Exercises     Gait training     Modality__________________                Total 55    Blank areas are intentional and mean the treatment did not include these items.       Vaishali Gallego  2019

## 2021-06-01 NOTE — PROGRESS NOTES
"[facility] Daily Progress     Patient Name: Aysha Raya-Otremba  Date: 9/5/2019  Visit #12/16  Referral Diagnosis:   Thoracic radiculitis   Thoracic degenerative disc disease   Chronic bilateral thoracic back pain   Chronic bilateral low back pain without sciatica   Myofascial pain   Referring provider: mAanda Sandhu C*  Visit Diagnosis:     ICD-10-CM    1. Pain in thoracic spine M54.6    2. Chronic bilateral low back pain without sciatica M54.5     G89.29    3. Rib pain R07.81          Assessment:     HEP/POC compliance is  good .  Patient demonstrates understanding/independence with home program.  Patient is appropriate to continue with skilled physical therapy intervention, as indicated by initial plan of care.    Goal Status:  Pt. will demonstrate/verbalize independence in self-management of condition in : 12 weeks;Progressing toward  Pt. will be independent with home exercise program in : 12 weeks;Progressing toward  Pt. will report decreased intensity, frequency of : Pain;in 12 weeks;Comment;Progressing toward  Comment:: decrease thoracic pain by 25% or better  Pt. will have improved quality of sleep: waking less times/night;in 12 weeks;Comment;Progressing toward  Comment:: waking 0-1x /night   status: \"not too bad the last couple nights\"  Pt. will be able to walk : 60 minutes;with less pain;with less difficulty;for community mobility;for exercise/recreation;in 12 weeks  Patient will stand : 60 minutes;with less difficultty;with less pain;for home chores;in 12 weeks;Progressing toward  Pt. will bend: to dress;to clean;with less pain;with less difficulty;for self care;for exercise/recreation;in 12 weeks;Progressing toward  Patient will twist/turn : in bed;while standing;with less difficulty;with less pain;for bed mobilitiy;in 12 weeks;Progressing toward  Patient will return to: exercise;sport;in 12 weeks;Not Met    No data recorded    Plan / Patient Education:     Continue with initial " "plan of care.  Progress with home program as tolerated.    Subjective:     Pain Ratin  MRI today. \"It's been rough the last couple days\". Notes she got off schedule with some of her meds, which could be a contributing factor. Felt okay after the last session. Notes her symptoms have been shifting some with treatment.     Objective:     Cranial scan is (+) for (R) thoracic axillary myochains, (R) thoracic periosteal.   Multiple tender points (R) ant/post axillary rib cage.  Fascial restrictions of thoracic inlet, respiratory diaphragm, mesenteric root.     Treatment Today     TREATMENT MINUTES COMMENTS   Evaluation     Self-care/ Home management     Manual therapy 55 Induction, indirect, direct techniques utilized as appropriate for optimal tissue release.   MFR/SCS - (R) AR4-5-6-MS, thoracic inlet, respiratory diaphragm, mesenteric root, (R) R6-9S(P), (R) T6-8F(P), (R) upper thorax   Neuromuscular Re-education     Therapeutic Activity     Therapeutic Exercises     Gait training     Modality__________________                Total 55    Blank areas are intentional and mean the treatment did not include these items.       Vaishali Gallego  2019    "

## 2021-06-01 NOTE — TELEPHONE ENCOUNTER
----- Message from Amanda Sandhu CNP sent at 9/9/2019  4:09 PM CDT -----  Please call patient and notify her that I did review her thoracic and lumbar spine MRI.  There is a right disc extrusion at the lower thoracic spine T11-12 region with mild nerve compression.  There is also degenerative disc changes at T4-5 and T11-12.  Since she is gotten no benefit with physical therapy we could consider an interlaminar injection at the T11-12 region to see if we get relief of pain related to that nerve on the right.  Lumbar spine MRI shows mild shifting at L4-5 with mild to moderate facet arthropathy/arthritis, mild facet arthritis at L5-S1.  No significant nerve compression of the lumbar spine.

## 2021-06-01 NOTE — PROGRESS NOTES
"[facility] Daily Progress     Patient Name: Aysha Raya-Otremba  Date: 9/12/2019  Visit #13/16  Referral Diagnosis:   Thoracic radiculitis   Thoracic degenerative disc disease   Chronic bilateral thoracic back pain   Chronic bilateral low back pain without sciatica   Myofascial pain   Referring provider: Amanda Sandhu C*  Visit Diagnosis:     ICD-10-CM    1. Pain in thoracic spine M54.6    2. Chronic bilateral low back pain without sciatica M54.5     G89.29    3. Rib pain R07.81          Assessment:     Response to Intervention fair  Patient is appropriate to continue with skilled physical therapy intervention, as indicated by initial plan of care.    Goal Status:  Pt. will demonstrate/verbalize independence in self-management of condition in : 12 weeks;Progressing toward  Pt. will be independent with home exercise program in : 12 weeks;Progressing toward  Pt. will report decreased intensity, frequency of : Pain;in 12 weeks;Comment;Progressing toward  Comment:: decrease thoracic pain by 25% or better  Pt. will have improved quality of sleep: waking less times/night;in 12 weeks;Comment;Progressing toward  Comment:: waking 0-1x /night   status: \"not too bad the last couple nights\"  Pt. will be able to walk : 60 minutes;with less pain;with less difficulty;for community mobility;for exercise/recreation;in 12 weeks  Patient will stand : 60 minutes;with less difficultty;with less pain;for home chores;in 12 weeks;Progressing toward  Pt. will bend: to dress;to clean;with less pain;with less difficulty;for self care;for exercise/recreation;in 12 weeks;Progressing toward  Patient will twist/turn : in bed;while standing;with less difficulty;with less pain;for bed mobilitiy;in 12 weeks;Progressing toward  Patient will return to: exercise;sport;in 12 weeks;Not Met    No data recorded    Plan / Patient Education:     Continue with initial plan of care.  Progress with home program as tolerated.  patient " will follow up with provider, look into acupuncture.     Subjective:     Pain Ratin  Had MRI last week. Got results by phone, will schedule an appointment to review options. Injection was suggested. She is not sure what she would like to do. Still considering acupuncture, had another massage with benefit. Generally, tense and tight in the back. Having migraines this week.     Objective:     Mod/major fascial restrictions of transverse diaphragms, liver, mid/lower rib cage, (R) thorax.     MRI results per Epic:  [facility]  MR THORACIC SPINE WITHOUT CONTRAST, MR LUMBAR SPINE WITHOUT CONTRAST  2019, 6:10 PM      INDICATION: Chronic thoracic pain. Thoracic osteoarthritis. Low back pain.  TECHNIQUE: Routine.  CONTRAST: None.  COMPARISON: Abdomen and pelvis CT 2017.     FINDINGS:  MRI THORACIC SPINE:  image demonstrates degenerative disc osteophyte complexes in the lower cervical spine C4-C5 through C6-C7 with mild spinal canal narrowing. Normal thoracic vertebral body heights. No compression fracture deformity or   subluxation. Normal thoracic spinal cord caliber and signal. T2 hyperintense degenerative endplate changes ventrally at T4-T5 and T11-T12. Mild edema at the left costovertebral junction. Otherwise, benign marrow signal. Mild to moderate degenerative loss   of disc height and signal T2-T3 through T5-T6. Small disc herniations at these levels as well as an asymmetric right-sided disc extrusion at T11-T12. This mildly narrows the right neural foramen. Mild facet arthropathy T3-T4. No central thoracic spinal   canal stenosis.     MRI LUMBAR SPINE: Nomenclature is based on five lumbar-type vertebral bodies. Normal vertebral body heights. Slight anterolisthesis of L4. The visualized portions of the bony pelvis and proximal femora are unremarkable for age. No marrow edema. No pars   defect. The conus tip is identified at L1-L2. Unremarkable paraspinal soft tissues. No abdominal aortic  aneurysm.  T12-L1: Normal disc height and signal. No herniation. No facet arthropathy. No spinal canal stenosis. No foraminal stenosis.   L1-L2: Normal disc height and signal. No herniation. No facet arthropathy. No spinal canal stenosis. No foraminal stenosis.   L2-L3: Normal disc height and signal. No herniation. No facet arthropathy. No spinal canal stenosis. No foraminal stenosis.   L3-L4: Mild loss of disc height and signal. Shallow disc bulge. No facet arthropathy. Slight lateral recess narrowing. Adequate central canal. No foraminal stenosis.   L4-L5: Moderate loss of disc signal. Mild loss of disc height. Shallow disc bulge. Moderate facet arthropathy. No spinal canal stenosis. No foraminal stenosis.   L5-S1: Normal disc height and signal. No herniation. Mild facet arthropathy. No spinal canal stenosis. No foraminal stenosis.      IMPRESSION:   CONCLUSION:  MRI THORACIC SPINE:  1.  Mild to moderate disc degeneration throughout the thoracic spine. There is an asymmetric right disc extrusion at T11-T12 that mildly narrows the neural foramen. Mild facet arthropathy.  2.  Normal thoracic spinal cord.  3.  Edematous degenerative endplate changes T4-T5 and T11-T12. Otherwise, unremarkable marrow.     MRI LUMBAR SPINE:  1.  Mild to moderate disc and facet degeneration at L4-L5 with slight anterolisthesis of L4. No stenosis.  2.  Shallow disc bulge at L3-L4 slightly narrows the lateral recesses.  3.  Mild facet arthropathy at L5-S1.    Treatment Today     TREATMENT MINUTES COMMENTS   Evaluation     Self-care/ Home management     Manual therapy 53 Induction, indirect, direct techniques utilized as appropriate for optimal tissue release.   MFR/SCS - thoracic inlet, respiratory diaphragm , (R) TL-V, liver, (R) lower intercostal nn, (R) rib cage release/unwinding, (R) lung/mid/upper lobe,    Neuromuscular Re-education     Therapeutic Activity     Therapeutic Exercises     Gait training     Modality__________________                 Total 53    Blank areas are intentional and mean the treatment did not include these items.       Vaishali PABLO Julián  9/12/2019

## 2021-06-02 NOTE — PROGRESS NOTES
"[facility] Daily Progress     Patient Name: Aysha Raya-Otremba  Date: 10/3/2019  Visit #16/16  Referral Diagnosis:   Thoracic radiculitis   Thoracic degenerative disc disease   Chronic bilateral thoracic back pain   Chronic bilateral low back pain without sciatica   Myofascial pain   Referring provider: Amanda Sandhu C*  Visit Diagnosis:     ICD-10-CM    1. Pain in thoracic spine M54.6    2. Chronic bilateral low back pain without sciatica M54.5     G89.29    3. Rib pain R07.81          Assessment:     Patient demonstrates understanding/independence with home program.  Patient is appropriate to continue with skilled physical therapy intervention, as indicated by initial plan of care.    Goal Status:  Pt. will demonstrate/verbalize independence in self-management of condition in : 12 weeks;Progressing toward  Pt. will be independent with home exercise program in : 12 weeks;Progressing toward  Pt. will report decreased intensity, frequency of : Pain;in 12 weeks;Comment;Progressing toward  Comment:: decrease thoracic pain by 25% or better  Pt. will have improved quality of sleep: waking less times/night;in 12 weeks;Comment;Progressing toward  Comment:: waking 0-1x /night   status: \"not too bad the last couple nights\"  Pt. will be able to walk : 60 minutes;with less pain;with less difficulty;for community mobility;for exercise/recreation;in 12 weeks  Patient will stand : 60 minutes;with less difficultty;with less pain;for home chores;in 12 weeks;Progressing toward  Pt. will bend: to dress;to clean;with less pain;with less difficulty;for self care;for exercise/recreation;in 12 weeks;Progressing toward  Patient will twist/turn : in bed;while standing;with less difficulty;with less pain;for bed mobilitiy;in 12 weeks;Progressing toward  Patient will return to: exercise;sport;in 12 weeks;Not Met    No data recorded    Plan / Patient Education:     Continue with initial plan of care.  Progress with " home program as tolerated.  consider trial of reformer exercise.   Patient to schedule with Spine care to follow up on MRI results.   POC has been updated.     Authorization / Certification Number of Visits: pref one  Communication with: Referral Source  Patient Related Instruction: Nature of Condition;Treatment plan and rationale;Self Care instruction;Basis of treatment;Body mechanics;Posture;Next steps  Times per Week: 1-2  Number of Weeks: 6-12  Number of Visits: up to 12  Discharge Planning: HEP, self management  Precautions / Restrictions : none  Therapeutic Exercise: ROM;Stretching;Strengthening;Other  Therapeutic Exercise : pilates reformer as appropriate  Neuromuscular Reeducation: posture;kinesio tape;core  Manual Therapy: strain counterstrain;myofascial release      Subjective:     Pain Ratin  Has not followed up with Spine Care.   Pain hasn't been as bad the last week or so. Pain intensity has decreased, but still constant. Still has pain with movement.       Objective:     Cranial scan is (+) for (L) thoracic periosteal, (L) thoracic disc, (R) thoracic myochain,   Mult tender points (B) lower ribs,     Treatment Today     TREATMENT MINUTES COMMENTS   Evaluation     Self-care/ Home management     Manual therapy 55 Induction, indirect, direct techniques utilized as appropriate for optimal tissue release.   MFR/SCS - (R) AR10-MS, (R) DT10(C)-MS, (L) DT11-12(C)-MS, (L) BL-LV, (L)T9-11F(P), (R) R9-11I(P)-MS,     Neuromuscular Re-education     Therapeutic Activity     Therapeutic Exercises     Gait training     Modality__________________                Total 55    Blank areas are intentional and mean the treatment did not include these items.       Vaishali Gallego  10/3/2019

## 2021-06-02 NOTE — PROGRESS NOTES
[facility] Daily Progress     Patient Name: Aysha Raya-Otremba  Date: 11/1/2019  Visit #22/16+12  Referral Diagnosis:   Thoracic radiculitis   Thoracic degenerative disc disease   Chronic bilateral thoracic back pain   Chronic bilateral low back pain without sciatica   Myofascial pain   Referring provider: Amanda Sandhu C*  Visit Diagnosis:     ICD-10-CM    1. Pain in thoracic spine M54.6    2. Chronic bilateral low back pain without sciatica M54.5     G89.29    3. Rib pain R07.81          Assessment:     Patient demonstrates understanding/independence with home program.  Response to Intervention good  Patient is benefitting from skilled physical therapy and is making steady progress toward functional goals.  Patient is appropriate to continue with skilled physical therapy intervention, as indicated by initial plan of care.    Goal Status:  Pt. will demonstrate/verbalize independence in self-management of condition in : 12 weeks;Progressing toward  Pt. will be independent with home exercise program in : 12 weeks;Progressing toward  Pt. will report decreased intensity, frequency of : Pain;in 12 weeks;Comment;Progressing toward  Comment:: decrease thoracic pain by 25% or better  Pt. will have improved quality of sleep: waking less times/night;in 12 weeks;Comment;Progressing toward  Comment:: waking 0-1x /night   status: sleep has improved overall, but less comfortable the last 2 weeks.   Pt. will be able to walk : 60 minutes;with less pain;with less difficulty;for community mobility;for exercise/recreation;in 12 weeks;Comment;Progressing toward  Comment:: status: tolerance 30-45 min  Patient will stand : 60 minutes;with less difficultty;with less pain;for home chores;in 12 weeks;Progressing toward;Comment  Comment:: status: tolerance 15 min  Pt. will bend: to dress;to clean;with less pain;with less difficulty;for self care;for exercise/recreation;in 12 weeks;Progressing  toward;Comment  Comment:: status: less pain with donning shoes  Patient will twist/turn : in bed;while standing;with less difficulty;with less pain;for bed mobilitiy;in 12 weeks;Progressing toward  Patient will return to: exercise;sport;in 12 weeks;Not Met    No data recorded    Plan / Patient Education:     Continue with initial plan of care.  Progress with home program as tolerated.  Patient to schedule evaluation for migraines.     Subjective:     Pain Rating: 3  Saw neurologist for migraines and got PT orders for that. Otherwise, is doing okay. Sx are stable. Still feels limited with doing physical activities. Pain with bending, twisting, lifting. Taking gabapentin PRN, not daily.       Objective:     Cranial scan is (+) for (B) lymphatics, R>L thoracic arterials, (B) thoracic sup. fascia.   Tender points notes (R) inf rib margins, (L) thoracic paraspinals, thor/upper lumbar spinous processes/mult levels, (B) xyphoid, ant abdominals.     Treatment Today     TREATMENT MINUTES COMMENTS   Evaluation     Self-care/ Home management     Manual therapy 55 .Induction, indirect, direct techniques utilized as appropriate for optimal tissue release.   MFR/SCS - (R) EPIT7-10-LV, (L) HAZY-LV, X8XVZ-E, Q92-80SIP-J, I7IOC-Y,  (R) PUOTJ36-51-M, (B) LSER-SF,    Neuromuscular Re-education     Therapeutic Activity     Therapeutic Exercises     Gait training     Modality__________________                Total 55    Blank areas are intentional and mean the treatment did not include these items.       Vaishali Gallego  11/1/2019

## 2021-06-02 NOTE — PROGRESS NOTES
[facility] Daily Progress     Patient Name: Aysha Raya-Otremba  Date: 10/10/2019  Visit #18/16+12  Referral Diagnosis:   Thoracic radiculitis   Thoracic degenerative disc disease   Chronic bilateral thoracic back pain   Chronic bilateral low back pain without sciatica   Myofascial pain   Referring provider: Amanda Sandhu C*  Visit Diagnosis:     ICD-10-CM    1. Pain in thoracic spine M54.6    2. Chronic bilateral low back pain without sciatica M54.5     G89.29    3. Rib pain R07.81          Assessment:     Patient demonstrates understanding/independence with home program.  Response to Intervention good  Patient is benefitting from skilled physical therapy and is making steady progress toward functional goals.  Patient is appropriate to continue with skilled physical therapy intervention, as indicated by initial plan of care.    Goal Status:  Pt. will demonstrate/verbalize independence in self-management of condition in : 12 weeks;Progressing toward  Pt. will be independent with home exercise program in : 12 weeks;Progressing toward  Pt. will report decreased intensity, frequency of : Pain;in 12 weeks;Comment;Progressing toward  Comment:: decrease thoracic pain by 25% or better  Pt. will have improved quality of sleep: waking less times/night;in 12 weeks;Comment;Progressing toward  Comment:: waking 0-1x /night   status: having trouble falling asleep, waking often, restless at night  Pt. will be able to walk : 60 minutes;with less pain;with less difficulty;for community mobility;for exercise/recreation;in 12 weeks  Patient will stand : 60 minutes;with less difficultty;with less pain;for home chores;in 12 weeks;Progressing toward;Comment  Pt. will bend: to dress;to clean;with less pain;with less difficulty;for self care;for exercise/recreation;in 12 weeks;Progressing toward;Comment  Comment:: status: less pain with donning shoes  Patient will twist/turn : in bed;while standing;with less  difficulty;with less pain;for bed mobilitiy;in 12 weeks;Progressing toward  Patient will return to: exercise;sport;in 12 weeks;Not Met    No data recorded    Plan / Patient Education:     Continue with initial plan of care.  Progress with home program as tolerated.    Subjective:     Pain Ratin-3  Had a massage yesterday. Has a lot of work stress right now. Still better overall. Movement still painful, but has improved.     Objective:     Cranial scan is (+) for (L) axilllary myochain, (L) TL LF, (R) thoracic periosteal, R>L thoracic arterials.   Multiple tender points (B) rib cage, (R) post thoracic spine.    Treatment Today     TREATMENT MINUTES COMMENTS   Evaluation     Self-care/ Home management     Manual therapy 55 Induction, indirect, direct techniques utilized as appropriate for optimal tissue release.   MFR/SCS - (L) ZBL09-09-KM, (L) AR8-MS, (L) AR10-MS, (R) T8-10E(P), (R) PINT8-11-A, (L) CC-A (modified), (L) SUBC-A   Neuromuscular Re-education     Therapeutic Activity     Therapeutic Exercises     Gait training     Modality__________________                Total 55    Blank areas are intentional and mean the treatment did not include these items.       Vaishali Gallego  10/10/2019

## 2021-06-02 NOTE — PROGRESS NOTES
[facility] Daily Progress     Patient Name: Aysha Raya-Otremba  Date: 10/29/2019  Visit #21/16+12  Referral Diagnosis:   Thoracic radiculitis   Thoracic degenerative disc disease   Chronic bilateral thoracic back pain   Chronic bilateral low back pain without sciatica   Myofascial pain   Referring provider: Amanda Sandhu C*  Visit Diagnosis:     ICD-10-CM    1. Pain in thoracic spine M54.6    2. Chronic bilateral low back pain without sciatica M54.5     G89.29    3. Rib pain R07.81          Assessment:     HEP/POC compliance is  good .  Patient demonstrates understanding/independence with home program.  Response to Intervention good  Patient is benefitting from skilled physical therapy and is making steady progress toward functional goals.  Patient is appropriate to continue with skilled physical therapy intervention, as indicated by initial plan of care.    Goal Status:  Pt. will demonstrate/verbalize independence in self-management of condition in : 12 weeks;Progressing toward  Pt. will be independent with home exercise program in : 12 weeks;Progressing toward  Pt. will report decreased intensity, frequency of : Pain;in 12 weeks;Comment;Progressing toward  Comment:: decrease thoracic pain by 25% or better  Pt. will have improved quality of sleep: waking less times/night;in 12 weeks;Comment;Progressing toward  Comment:: waking 0-1x /night   status: having trouble falling asleep, waking often, restless at night  Pt. will be able to walk : 60 minutes;with less pain;with less difficulty;for community mobility;for exercise/recreation;in 12 weeks  Patient will stand : 60 minutes;with less difficultty;with less pain;for home chores;in 12 weeks;Progressing toward;Comment  Pt. will bend: to dress;to clean;with less pain;with less difficulty;for self care;for exercise/recreation;in 12 weeks;Progressing toward;Comment  Comment:: status: less pain with donning shoes  Patient will twist/turn : in  bed;while standing;with less difficulty;with less pain;for bed mobilitiy;in 12 weeks;Progressing toward  Patient will return to: exercise;sport;in 12 weeks;Not Met    No data recorded    Plan / Patient Education:     Continue with initial plan of care.  Progress with home program as tolerated.    Subjective:     Pain Rating: 3-4  Has been a little more flared up since the last session. Has been trying to take less gabapentin, but took 2/day over the weekend.   Main complaint is (R) lower rib, TL area with radiation to (R) lat trunk.  Increased pain with rotation or trying to engage for lifting, bending, pulling.     Objective:     Cranial scan is (+) for (L) thoracic spine myochains, (R) thoracic ALL, (L) thoracic LF,   Mod tenderness of (B) lower thoracic spine/mult points,     Treatment Today     TREATMENT MINUTES COMMENTS   Evaluation     Self-care/ Home management     Manual therapy 55 Induction, indirect, direct techniques utilized as appropriate for optimal tissue release.   MFR/SCS - (R) [id number]-FC, (L) KML41-58-NM, (L) BDP69-WU, (L) TII44-GM, (L) LFL2-3-MS, (R) R10(C)-MS, respiratory diaphragm   Neuromuscular Re-education     Therapeutic Activity     Therapeutic Exercises     Gait training     Modality__________________                Total 55    Blank areas are intentional and mean the treatment did not include these items.       Vaishali Gallego  10/29/2019

## 2021-06-02 NOTE — PROGRESS NOTES
[facility] Daily Progress     Patient Name: Aysha Raya-Otremba  Date: 10/25/2019  Visit #20/16+12  Referral Diagnosis:   Thoracic radiculitis   Thoracic degenerative disc disease   Chronic bilateral thoracic back pain   Chronic bilateral low back pain without sciatica   Myofascial pain   Referring provider: Amanda Sandhu C*  Visit Diagnosis:     ICD-10-CM    1. Pain in thoracic spine M54.6    2. Chronic bilateral low back pain without sciatica M54.5     G89.29    3. Rib pain R07.81          Assessment:     Patient demonstrates understanding/independence with home program.  Response to Intervention good  Patient is benefitting from skilled physical therapy and is making steady progress toward functional goals.  Patient is appropriate to continue with skilled physical therapy intervention, as indicated by initial plan of care.    Goal Status:  Pt. will demonstrate/verbalize independence in self-management of condition in : 12 weeks;Progressing toward  Pt. will be independent with home exercise program in : 12 weeks;Progressing toward  Pt. will report decreased intensity, frequency of : Pain;in 12 weeks;Comment;Progressing toward  Comment:: decrease thoracic pain by 25% or better  Pt. will have improved quality of sleep: waking less times/night;in 12 weeks;Comment;Progressing toward  Comment:: waking 0-1x /night   status: having trouble falling asleep, waking often, restless at night  Pt. will be able to walk : 60 minutes;with less pain;with less difficulty;for community mobility;for exercise/recreation;in 12 weeks  Patient will stand : 60 minutes;with less difficultty;with less pain;for home chores;in 12 weeks;Progressing toward;Comment  Pt. will bend: to dress;to clean;with less pain;with less difficulty;for self care;for exercise/recreation;in 12 weeks;Progressing toward;Comment  Comment:: status: less pain with donning shoes  Patient will twist/turn : in bed;while standing;with less  difficulty;with less pain;for bed mobilitiy;in 12 weeks;Progressing toward  Patient will return to: exercise;sport;in 12 weeks;Not Met    No data recorded    Plan / Patient Education:     Continue with initial plan of care.  Progress with home program as tolerated.    Subjective:     Pain Rating: 3  Doing okay. Has had a little more pain the last couple days. Took some gabapentin yesterday for symptom management. Hasn't had a migraine since last Monday.       Objective:     Cranial scan is (+) for (R) thoracic myochains, (L) costal cartilage, (R) thoracic disc, (R) ALL.   Multiple tender points in (B) mid/lower rib cage, lower thoracic spine.     Treatment Today     TREATMENT MINUTES COMMENTS   Evaluation     Self-care/ Home management     Manual therapy 55 Induction, indirect, direct techniques utilized as appropriate for optimal tissue release.   MFR/SCS - (R) UET9-MS, (R) SJK02-AQ, (R) AR8-10-MS, (R) DT11-12(C)-MS, respiratory diaphragm, liver, (L)R8-10(C)-MS,     Neuromuscular Re-education     Therapeutic Activity     Therapeutic Exercises     Gait training     Modality__________________                Total 55    Blank areas are intentional and mean the treatment did not include these items.       Vaishali Gallego  10/25/2019

## 2021-06-02 NOTE — PROGRESS NOTES
[facility] Daily Progress     Patient Name: Aysha Raya-Otremba  Date: 10/8/2019  Visit #17/16+12  Referral Diagnosis:   Thoracic radiculitis   Thoracic degenerative disc disease   Chronic bilateral thoracic back pain   Chronic bilateral low back pain without sciatica   Myofascial pain   Referring provider: Amanda Sandhu C*  Visit Diagnosis:     ICD-10-CM    1. Pain in thoracic spine M54.6    2. Chronic bilateral low back pain without sciatica M54.5     G89.29    3. Rib pain R07.81          Assessment:     HEP/POC compliance is  good .  Patient demonstrates understanding/independence with home program.  Response to Intervention good  Patient is benefitting from skilled physical therapy and is making steady progress toward functional goals.  Patient is appropriate to continue with skilled physical therapy intervention, as indicated by initial plan of care.    Goal Status:  Pt. will demonstrate/verbalize independence in self-management of condition in : 12 weeks;Progressing toward  Pt. will be independent with home exercise program in : 12 weeks;Progressing toward  Pt. will report decreased intensity, frequency of : Pain;in 12 weeks;Comment;Progressing toward  Comment:: decrease thoracic pain by 25% or better  Pt. will have improved quality of sleep: waking less times/night;in 12 weeks;Comment;Progressing toward  Comment:: waking 0-1x /night   status: having trouble falling asleep, waking often, restless at night  Pt. will be able to walk : 60 minutes;with less pain;with less difficulty;for community mobility;for exercise/recreation;in 12 weeks  Patient will stand : 60 minutes;with less difficultty;with less pain;for home chores;in 12 weeks;Progressing toward;Comment  Pt. will bend: to dress;to clean;with less pain;with less difficulty;for self care;for exercise/recreation;in 12 weeks;Progressing toward;Comment  Comment:: status: less pain with donning shoes  Patient will twist/turn : in  "bed;while standing;with less difficulty;with less pain;for bed mobilitiy;in 12 weeks;Progressing toward  Patient will return to: exercise;sport;in 12 weeks;Not Met    No data recorded    Plan / Patient Education:     Continue with initial plan of care.  Progress with home program as tolerated.    Subjective:     Pain Ratin  Has been \"pretty good\". Feels like she's turned a corner with treatment. Overall, decreased pain and less pain with movement. Less pain with bending, lifting, twisting, now 2-3/10 rather than 4-5+/10. Scheduled a massage for tomorrow.       Objective:     Cranial scan is (+) for (L) TL periosteal, (L) ALL, (R) PLL, (L) TL myochain. L>R TL disc.  Multiple tender points (B) ant/lat ribs, (B) rib tubercles.     Treatment Today     TREATMENT MINUTES COMMENTS   Evaluation     Self-care/ Home management     Manual therapy 55 Induction, indirect, direct techniques utilized as appropriate for optimal tissue release.   MFR/SCS - (L) AR6-7-MS, (R) AR8-9-MS, (L) BRLB59-27-KC, (R) OTEF11-FS, (L) T10E(P), (L) AIX12-KP, (R) DT11-12(C)-MS,    Neuromuscular Re-education     Therapeutic Activity     Therapeutic Exercises     Gait training     Modality__________________                Total 55    Blank areas are intentional and mean the treatment did not include these items.       Vaishali Gallego  10/8/2019    "

## 2021-06-02 NOTE — PROGRESS NOTES
[facility] Daily Progress     Patient Name: Aysha Raya-Otremba  Date: 10/23/2019  Visit #19/16+12  Referral Diagnosis:   Thoracic radiculitis   Thoracic degenerative disc disease   Chronic bilateral thoracic back pain   Chronic bilateral low back pain without sciatica   Myofascial pain   Referring provider: Amanda Sandhu C*  Visit Diagnosis:     ICD-10-CM    1. Pain in thoracic spine M54.6    2. Chronic bilateral low back pain without sciatica M54.5     G89.29    3. Rib pain R07.81          Assessment:     HEP/POC compliance is  good .  Patient demonstrates understanding/independence with home program.  Response to Intervention good  Patient is benefitting from skilled physical therapy and is making steady progress toward functional goals.  Patient is appropriate to continue with skilled physical therapy intervention, as indicated by initial plan of care.    Goal Status:  Pt. will demonstrate/verbalize independence in self-management of condition in : 12 weeks;Progressing toward  Pt. will be independent with home exercise program in : 12 weeks;Progressing toward  Pt. will report decreased intensity, frequency of : Pain;in 12 weeks;Comment;Progressing toward  Comment:: decrease thoracic pain by 25% or better  Pt. will have improved quality of sleep: waking less times/night;in 12 weeks;Comment;Progressing toward  Comment:: waking 0-1x /night   status: having trouble falling asleep, waking often, restless at night  Pt. will be able to walk : 60 minutes;with less pain;with less difficulty;for community mobility;for exercise/recreation;in 12 weeks  Patient will stand : 60 minutes;with less difficultty;with less pain;for home chores;in 12 weeks;Progressing toward;Comment  Pt. will bend: to dress;to clean;with less pain;with less difficulty;for self care;for exercise/recreation;in 12 weeks;Progressing toward;Comment  Comment:: status: less pain with donning shoes  Patient will twist/turn : in  bed;while standing;with less difficulty;with less pain;for bed mobilitiy;in 12 weeks;Progressing toward  Patient will return to: exercise;sport;in 12 weeks;Not Met    No data recorded    Plan / Patient Education:     Continue with initial plan of care.  Progress with home program as tolerated.    Subjective:     Pain Ratin  Overall, doing better. About the same since the last session. Baseline pain level 2/10, increases with activities. Was more sore over the weekend with doing yard clean up. Has been having more migraines, pain in jaw. Continues to have a lot of work stress related to lay offs, restructuring. Hasn't been taking gabapentin daily. Not waking with as much pain. Feels it more in the afternoon with prolonged standing.     Objective:     Cranial scan is (+) for (B) thoracic periosteal, (R) lower rib cartilage.   Multiple tender points with associated fascial restrictions (B) rib cage, lower thoracic spine,     Treatment Today     TREATMENT MINUTES COMMENTS   Evaluation     Self-care/ Home management     Manual therapy 55 Induction, indirect, direct techniques utilized as appropriate for optimal tissue release.   MFR/SCS - (B) R10-11I(P) MS, (B) T9-10E(P), (R) PER-V,  (R) R9(C), respiratory diaphragm, liver mobility,    Neuromuscular Re-education     Therapeutic Activity     Therapeutic Exercises     Gait training     Modality__________________                Total 55    Blank areas are intentional and mean the treatment did not include these items.       Vaishali Gallego  10/23/2019

## 2021-06-03 VITALS — BODY MASS INDEX: 20.38 KG/M2 | WEIGHT: 138 LBS

## 2021-06-03 NOTE — PROGRESS NOTES
[facility] Daily Progress     Patient Name: Aysha Raya-Otremba  Date: 11/22/2019  Visit #4/12 neck/HA,  26/16+12 thoracic/ribs  Referral Diagnosis: Headaches and neck pain  Thoracic radiculitis   Thoracic degenerative disc disease   Chronic bilateral thoracic back pain   Chronic bilateral low back pain without sciatica   Myofascial pain   Referring provider:Eirc Ramos MD, Amanda Sandhu C*  Visit Diagnosis:     ICD-10-CM    1. Pain in thoracic spine M54.6    2. Chronic bilateral low back pain without sciatica M54.5     G89.29    3. Rib pain R07.81    4. Migraine without aura and without status migrainosus, not intractable G43.009    5. Cervicalgia M54.2          Assessment:     HEP/POC compliance is  good .  Patient demonstrates understanding/independence with home program.  Response to Intervention good  Patient is benefitting from skilled physical therapy and is making steady progress toward functional goals.  Patient is appropriate to continue with skilled physical therapy intervention, as indicated by initial plan of care.    Goal Status:  Pt. will demonstrate/verbalize independence in self-management of condition in : 12 weeks;Progressing toward  Pt. will be independent with home exercise program in : 12 weeks;Progressing toward  Pt. will report decreased intensity, frequency of : Pain;in 12 weeks;Comment;Progressing toward  Comment:: decrease thoracic pain by 25% or better  Pt. will have improved quality of sleep: waking less times/night;in 12 weeks;Comment;Progressing toward  Comment:: waking 0-1x /night   status: sleep has improved overall, but less comfortable the last 2 weeks.   Pt. will be able to walk : 60 minutes;with less pain;with less difficulty;for community mobility;for exercise/recreation;in 12 weeks;Comment;Progressing toward  Comment:: status: tolerance 30-45 min  Patient will stand : 60 minutes;with less difficultty;with less pain;for home chores;in 12  weeks;Progressing toward;Comment  Comment:: status: tolerance 15 min  Pt. will bend: to dress;to clean;with less pain;with less difficulty;for self care;for exercise/recreation;in 12 weeks;Progressing toward;Comment  Comment:: status: less pain with donning shoes  Patient will twist/turn : in bed;while standing;with less difficulty;with less pain;for bed mobilitiy;in 12 weeks;Progressing toward  Patient will return to: exercise;sport;in 12 weeks;Not Met    Pt will: report decreased HA frequency to 0-1x/week, 50% decreased duration of symptoms in 8 weeks.       Plan / Patient Education:     Continue with initial plan of care.  Progress with home program as tolerated.    Subjective:     Pain Ratin/10 HA, 2/10 back  Migraine onset yesterday, hasn't improved much since then. Back is okay. Had a massage yesterday. HA is primary complaint today.     Objective:     CRI/ is 8-10 sec/cycle  Amplitude is  fair .  Quality is  fair .     Local listening: restrictions noted in central cranium  Cranial vault mobility: restrictions noted in temporals, occiput, temporal sutures, parietals.       Treatment Today     TREATMENT MINUTES COMMENTS   Evaluation     Self-care/ Home management     Manual therapy 55 Induction, indirect, direct techniques utilized as appropriate for optimal tissue release.   MFR/SCS - parietal lift, still pt/cv4, occipital release, cranial base, temporal wobble, finger in ear, ear pull, (L) parietal notch,    Neuromuscular Re-education     Therapeutic Activity     Therapeutic Exercises     Gait training     Modality__________________                Total 55    Blank areas are intentional and mean the treatment did not include these items.       Vaishali Gallego  2019

## 2021-06-03 NOTE — PROGRESS NOTES
[facility] Daily Progress     Patient Name: Aysha Raya-Otremba  Date: 11/5/2019  Visit #23/16+12  Referral Diagnosis:   Thoracic radiculitis   Thoracic degenerative disc disease   Chronic bilateral thoracic back pain   Chronic bilateral low back pain without sciatica   Myofascial pain   Referring provider: Amanda Sandhu C*  Visit Diagnosis:     ICD-10-CM    1. Pain in thoracic spine M54.6    2. Chronic bilateral low back pain without sciatica M54.5     G89.29    3. Rib pain R07.81          Assessment:     HEP/POC compliance is  good .  Patient demonstrates understanding/independence with home program.  Response to Intervention good  Patient is benefitting from skilled physical therapy and is making steady progress toward functional goals.  Patient is appropriate to continue with skilled physical therapy intervention, as indicated by initial plan of care.    Goal Status:  Pt. will demonstrate/verbalize independence in self-management of condition in : 12 weeks;Progressing toward  Pt. will be independent with home exercise program in : 12 weeks;Progressing toward  Pt. will report decreased intensity, frequency of : Pain;in 12 weeks;Comment;Progressing toward  Comment:: decrease thoracic pain by 25% or better  Pt. will have improved quality of sleep: waking less times/night;in 12 weeks;Comment;Progressing toward  Comment:: waking 0-1x /night   status: sleep has improved overall, but less comfortable the last 2 weeks.   Pt. will be able to walk : 60 minutes;with less pain;with less difficulty;for community mobility;for exercise/recreation;in 12 weeks;Comment;Progressing toward  Comment:: status: tolerance 30-45 min  Patient will stand : 60 minutes;with less difficultty;with less pain;for home chores;in 12 weeks;Progressing toward;Comment  Comment:: status: tolerance 15 min  Pt. will bend: to dress;to clean;with less pain;with less difficulty;for self care;for exercise/recreation;in 12  weeks;Progressing toward;Comment  Comment:: status: less pain with donning shoes  Patient will twist/turn : in bed;while standing;with less difficulty;with less pain;for bed mobilitiy;in 12 weeks;Progressing toward  Patient will return to: exercise;sport;in 12 weeks;Not Met    No data recorded    Plan / Patient Education:     Continue with initial plan of care.  Progress with home program as tolerated.  Pt scheduled for eval here for migraines later this week.  Has PT orders for HA/neck pain.      Subjective:     Pain Rating: 3  Had more pain on (L) side/lower ribs yesterday. Progressed throughout the day. Took some gabapentin later so she could sleep. Raked leaves for several hours on Sunday.       Objective:     Cranial scan is (+) for (B.) TL myochains.   Multiple tender points (B) lower/ant ribs, (L) lower thoracic/lumbar spinous processes.    Treatment Today     TREATMENT MINUTES COMMENTS   Evaluation     Self-care/ Home management     Manual therapy 55 Induction, indirect, direct techniques utilized as appropriate for optimal tissue release.   MFR/SCS - (L) NDY06-VI, (L) YZE79-HI, (L) LEL1-MS, (L) LEL3-MS, (L) AR10-MS, (L) AR11-MS, (L) AR12-MS, (R) AR9-10-MS, respiratory diaphragm ,    Neuromuscular Re-education     Therapeutic Activity     Therapeutic Exercises     Gait training     Modality__________________                Total 55    Blank areas are intentional and mean the treatment did not include these items.       Vaishali Gallego  11/5/2019
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
## 2021-06-03 NOTE — PROGRESS NOTES
[facility] Daily Progress     Patient Name: Aysha Raya-Otremba  Date: 11/11/2019  Visit #2/12 neck/HA,  24/16+12 thoracic/ribs  Referral Diagnosis: Headaches and neck pain  Thoracic radiculitis   Thoracic degenerative disc disease   Chronic bilateral thoracic back pain   Chronic bilateral low back pain without sciatica   Myofascial pain   Referring provider:Eric Ramos MD, Amanda Sandhu C*  Visit Diagnosis:     ICD-10-CM    1. Pain in thoracic spine M54.6    2. Chronic bilateral low back pain without sciatica M54.5     G89.29    3. Rib pain R07.81    4. Migraine without aura G43.009    5. Cervicalgia M54.2          Assessment:     HEP/POC compliance is  good .  Patient demonstrates understanding/independence with home program.  Response to Intervention good  Patient is benefitting from skilled physical therapy and is making steady progress toward functional goals.  Patient is appropriate to continue with skilled physical therapy intervention, as indicated by initial plan of care.    Goal Status:  Pt. will demonstrate/verbalize independence in self-management of condition in : 12 weeks;Progressing toward  Pt. will be independent with home exercise program in : 12 weeks;Progressing toward  Pt. will report decreased intensity, frequency of : Pain;in 12 weeks;Comment;Progressing toward  Comment:: decrease thoracic pain by 25% or better  Pt. will have improved quality of sleep: waking less times/night;in 12 weeks;Comment;Progressing toward  Comment:: waking 0-1x /night   status: sleep has improved overall, but less comfortable the last 2 weeks.   Pt. will be able to walk : 60 minutes;with less pain;with less difficulty;for community mobility;for exercise/recreation;in 12 weeks;Comment;Progressing toward  Comment:: status: tolerance 30-45 min  Patient will stand : 60 minutes;with less difficultty;with less pain;for home chores;in 12 weeks;Progressing toward;Comment  Comment:: status: tolerance  "15 min  Pt. will bend: to dress;to clean;with less pain;with less difficulty;for self care;for exercise/recreation;in 12 weeks;Progressing toward;Comment  Comment:: status: less pain with donning shoes  Patient will twist/turn : in bed;while standing;with less difficulty;with less pain;for bed mobilitiy;in 12 weeks;Progressing toward  Patient will return to: exercise;sport;in 12 weeks;Not Met    Pt will: report decreased HA frequency to 0-1x/week, 50% decreased duration of symptoms in 8 weeks.       Plan / Patient Education:     Continue with initial plan of care.  Progress with home program as tolerated.    Subjective:     Pain Ratin-3  Thoracic/rib area is \"doing okay\". HA pain has been better. Last session was helpful for HA pain. Onset of HA resolved with manual therapy, didn't have to take medication.       Objective:     Cranial scan is (+) for (B) mid/lower thoracic myochains,   Moderate tenderness (L) lower thoracic spine and ribs.   Fascial restrictions of temporal, sphenoid.  8-10 sec/cycle.     Treatment Today     TREATMENT MINUTES COMMENTS   Evaluation     Self-care/ Home management     Manual therapy 55 Induction, indirect, direct techniques utilized as appropriate for optimal tissue release.   MFR/SCS - (B) [id number], (B) [id number]-QR, (B) AR9-10-MS, temporal wobble, ear pull, finger in ear, sphenoid compression/decompression   Neuromuscular Re-education     Therapeutic Activity     Therapeutic Exercises     Gait training     Modality__________________                Total 55    Blank areas are intentional and mean the treatment did not include these items.       Vaishali Gallego  2019    "

## 2021-06-03 NOTE — PROGRESS NOTES
[facility] Daily Progress     Patient Name: Aysha Raya-Otremba  Date: 11/19/2019  Visit #3/12 neck/HA,  25/16+12 thoracic/ribs  Referral Diagnosis: Headaches and neck pain  Thoracic radiculitis   Thoracic degenerative disc disease   Chronic bilateral thoracic back pain   Chronic bilateral low back pain without sciatica   Myofascial pain   Referring provider:Eric Ramos MD, Amanda Sandhu C*  Visit Diagnosis:     ICD-10-CM    1. Pain in thoracic spine M54.6    2. Chronic bilateral low back pain without sciatica M54.5     G89.29    3. Rib pain R07.81    4. Migraine without aura and without status migrainosus, not intractable G43.009    5. Cervicalgia M54.2          Assessment:     HEP/POC compliance is  good .  Patient demonstrates understanding/independence with home program.  Response to Intervention good  Patient is benefitting from skilled physical therapy and is making steady progress toward functional goals.  Patient is appropriate to continue with skilled physical therapy intervention, as indicated by initial plan of care.    Goal Status:  Pt. will demonstrate/verbalize independence in self-management of condition in : 12 weeks;Progressing toward  Pt. will be independent with home exercise program in : 12 weeks;Progressing toward  Pt. will report decreased intensity, frequency of : Pain;in 12 weeks;Comment;Progressing toward  Comment:: decrease thoracic pain by 25% or better  Pt. will have improved quality of sleep: waking less times/night;in 12 weeks;Comment;Progressing toward  Comment:: waking 0-1x /night   status: sleep has improved overall, but less comfortable the last 2 weeks.   Pt. will be able to walk : 60 minutes;with less pain;with less difficulty;for community mobility;for exercise/recreation;in 12 weeks;Comment;Progressing toward  Comment:: status: tolerance 30-45 min  Patient will stand : 60 minutes;with less difficultty;with less pain;for home chores;in 12  weeks;Progressing toward;Comment  Comment:: status: tolerance 15 min  Pt. will bend: to dress;to clean;with less pain;with less difficulty;for self care;for exercise/recreation;in 12 weeks;Progressing toward;Comment  Comment:: status: less pain with donning shoes  Patient will twist/turn : in bed;while standing;with less difficulty;with less pain;for bed mobilitiy;in 12 weeks;Progressing toward  Patient will return to: exercise;sport;in 12 weeks;Not Met    Pt will: report decreased HA frequency to 0-1x/week, 50% decreased duration of symptoms in 8 weeks.       Plan / Patient Education:     Continue with initial plan of care.  Progress with home program as tolerated.    Subjective:     Pain Ratin  Migraines have been good until the last 2 days. Having some HA related brain fog. Back is doing better. Hasn't been needing to take gabapentin. Reports sleep disruption related to back pain. Felt pretty good after the last session.       Objective:     Cranial scan is (+) for (B) post/inf viscera, (B) thoracic myochains.   Mult tender pts (B) lower thoracic spine.       Treatment Today   Patient came a wrong time  TREATMENT MINUTES COMMENTS   Evaluation     Self-care/ Home management     Manual therapy 45 Induction, indirect, direct techniques utilized as appropriate for optimal tissue release.   MFR/SCS - duodenum, (L) DP-V, (R) KI-V, (L) KS-V, (L) BHU15-LH, (R) UET9-MS, respiratory diaphragm   Neuromuscular Re-education     Therapeutic Activity     Therapeutic Exercises     Gait training     Modality__________________                Total 45    Blank areas are intentional and mean the treatment did not include these items.       Vaishali Gallego  2019

## 2021-06-03 NOTE — PROGRESS NOTES
[facility] Daily Progress     Patient Name: Aysha Raya-Otremba  Date: 12/2/2019  Visit #6/12 neck/HA,  28/16+12 thoracic/ribs  Referral Diagnosis: Headaches and neck pain  Thoracic radiculitis   Thoracic degenerative disc disease   Chronic bilateral thoracic back pain   Chronic bilateral low back pain without sciatica   Myofascial pain   Referring provider:Eric Ramos MD, Amanda Sandhu C*  Visit Diagnosis:     ICD-10-CM    1. Pain in thoracic spine M54.6 Ambulatory referral to PT/OT - request continue   2. Chronic bilateral low back pain without sciatica M54.5 Ambulatory referral to PT/OT - request continue    G89.29    3. Rib pain R07.81 Ambulatory referral to PT/OT - request continue   4. Migraine without aura and without status migrainosus, not intractable G43.009    5. Cervicalgia M54.2          Assessment:     HEP/POC compliance is  good .  Patient demonstrates understanding/independence with home program.  Response to Intervention good  Patient is benefitting from skilled physical therapy and is making steady progress toward functional goals.  Patient is appropriate to continue with skilled physical therapy intervention, as indicated by initial plan of care.     Goal Status:  Pt. will demonstrate/verbalize independence in self-management of condition in : 12 weeks;Progressing toward  Pt. will be independent with home exercise program in : 12 weeks;Progressing toward  Pt. will report decreased intensity, frequency of : Pain;in 12 weeks;Comment;Progressing toward  Comment:: decrease thoracic pain by 25% or better  status: making progress. Overall pain level in lower thoracic area has improved.  Pt. will have improved quality of sleep: waking less times/night;in 12 weeks;Comment;Progressing toward  Comment:: waking 0-1x /night     Pt. will be able to walk : 60 minutes;with less pain;with less difficulty;for community mobility;for exercise/recreation;in 12 weeks;Comment;Progressing  "toward  Comment:: status: tolerance 30-45 min  Patient will stand : 60 minutes;with less difficultty;with less pain;for home chores;in 12 weeks;Progressing toward;Comment  Comment:: status: tolerance 15 min  Pt. will bend: to dress;to clean;with less pain;with less difficulty;for self care;for exercise/recreation;in 12 weeks;Progressing toward;Comment  Comment:: status: improved tolerance for doing yard work and shoveling snow.   Patient will twist/turn : in bed;while standing;with less difficulty;with less pain;for bed mobilitiy;in 12 weeks;Progressing toward  Patient will return to: exercise;sport;in 12 weeks;Progressing toward;Comment  Comment: status: has been doing some modified yoga    Pt will: report decreased HA frequency to 0-1x/week, 50% decreased duration of symptoms in 8 weeks.  status: making progress      Plan / Patient Education:     Patient remains appropriate for PT interventions.   Patient has completed 6/12 visits for migraines, referred by Eric Ramos MD.    Updated POC for thoracic/low back pain.   Will enter updated orders in Epic and contact provider.   Authorization / Certification Number of Visits: pref one  Communication with: Referral Source  Patient Related Instruction: Nature of Condition;Treatment plan and rationale;Self Care instruction;Basis of treatment;Body mechanics;Posture;Next steps  Times per Week: 1-2  Number of Weeks: 6-12  Number of Visits: up to 12 for thoracic pain  Discharge Planning: HEP, self management  Precautions / Restrictions : none  Therapeutic Exercise: ROM;Stretching;Strengthening;Other  Therapeutic Exercise : pilates reformer as appropriate  Neuromuscular Reeducation: posture;kinesio tape;core  Manual Therapy: strain counterstrain;myofascial release            Subjective:     Pain Ratin headache  Woke with a HA, gradually worsening throughout the day. R>L frontal, parietal pain, retroorbital pain, occipital pain. Back has been \"not great\". Had to do some " shoveling.  Upper thoracic area is bothering her more than usual. The last session seemed to help with that.     Objective:     Cranial scan is (+) for (R) post cranial/cerv arterials,     CRI/ is 8 sec/cycle  Amplitude is  fair .  Quality is  fair .     Vertex listening: restrictions noted in ant cranium      Treatment Today     TREATMENT MINUTES COMMENTS   Evaluation     Self-care/ Home management     Manual therapy 55 Induction, indirect, direct techniques utilized as appropriate for optimal tissue release.   MFR/SCS - (R) UVERT-A, cranial base, temporal wobble, frontal lift, (R) OLF-N, (L) OPT-N, (L) TRO-N, parietal lift,  (R) OPT-N, (R) TRO-N, finger in ear, vertex/ant cranium release,    Neuromuscular Re-education     Therapeutic Activity     Therapeutic Exercises     Gait training     Modality__________________                Total 55    Blank areas are intentional and mean the treatment did not include these items.       Vaishali Gallego  12/2/2019

## 2021-06-03 NOTE — PROGRESS NOTES
[facility] Daily Progress     Patient Name: Aysha Raya-Otremba  Date: 11/25/2019  Visit #5/12 neck/HA,  27/16+12 thoracic/ribs  Referral Diagnosis: Headaches and neck pain  Thoracic radiculitis   Thoracic degenerative disc disease   Chronic bilateral thoracic back pain   Chronic bilateral low back pain without sciatica   Myofascial pain   Referring provider:Eric Ramos MD, Amanda Sandhu C*  Visit Diagnosis:     ICD-10-CM    1. Pain in thoracic spine M54.6    2. Chronic bilateral low back pain without sciatica M54.5     G89.29    3. Rib pain R07.81    4. Migraine without aura and without status migrainosus, not intractable G43.009    5. Cervicalgia M54.2          Assessment:     HEP/POC compliance is  good .  Patient demonstrates understanding/independence with home program.  Response to Intervention good  Patient is benefitting from skilled physical therapy and is making steady progress toward functional goals.  Patient is appropriate to continue with skilled physical therapy intervention, as indicated by initial plan of care.    Goal Status:  Pt. will demonstrate/verbalize independence in self-management of condition in : 12 weeks;Progressing toward  Pt. will be independent with home exercise program in : 12 weeks;Progressing toward  Pt. will report decreased intensity, frequency of : Pain;in 12 weeks;Comment;Progressing toward  Comment:: decrease thoracic pain by 25% or better  Pt. will have improved quality of sleep: waking less times/night;in 12 weeks;Comment;Progressing toward  Comment:: waking 0-1x /night   status: sleep has improved overall, but less comfortable the last 2 weeks.   Pt. will be able to walk : 60 minutes;with less pain;with less difficulty;for community mobility;for exercise/recreation;in 12 weeks;Comment;Progressing toward  Comment:: status: tolerance 30-45 min  Patient will stand : 60 minutes;with less difficultty;with less pain;for home chores;in 12  weeks;Progressing toward;Comment  Comment:: status: tolerance 15 min  Pt. will bend: to dress;to clean;with less pain;with less difficulty;for self care;for exercise/recreation;in 12 weeks;Progressing toward;Comment  Comment:: status: less pain with donning shoes  Patient will twist/turn : in bed;while standing;with less difficulty;with less pain;for bed mobilitiy;in 12 weeks;Progressing toward  Patient will return to: exercise;sport;in 12 weeks;Not Met    Pt will: report decreased HA frequency to 0-1x/week, 50% decreased duration of symptoms in 8 weeks.       Plan / Patient Education:     Continue with initial plan of care.  Progress with home program as tolerated.  Will need updated POC/orders for thoracic pain next visit.     Subjective:     Pain Ratin/10 HA, 2-3/10 lower thoracic, 4-5/10 upper back  Still struggled with migraines over the weekend. Sx intensity has been variable. Dull pain today, pain in the upper back/interscapular area, also 2-3/10 pain in lower thoracic area.       Objective:     Cranial scan is (+) for (B) upper thoracic myochains,   Multiple tender points (B) upper to mid thoracic spine.  Fascial restrictions with associated tenderness R>L medial scapula.    Treatment Today     TREATMENT MINUTES COMMENTS   Evaluation     Self-care/ Home management     Manual therapy 55 Induction, indirect, direct techniques utilized as appropriate for optimal tissue release.   MFR/SCS - (B) UET3-MS, (B) UET5-MS, (B) UET7-MS, (R) UET9-MS, (B) medial scapular release, thoracic yoke, respiratory diaphragm,    Neuromuscular Re-education     Therapeutic Activity     Therapeutic Exercises     Gait training     Modality__________________                Total 55    Blank areas are intentional and mean the treatment did not include these items.       Vaishali Gallego  2019

## 2021-06-04 VITALS — WEIGHT: 142 LBS | BODY MASS INDEX: 20.97 KG/M2

## 2021-06-04 NOTE — PROGRESS NOTES
[facility] Daily Progress     Patient Name: Aysha Raya-Otremba  Date: 12/10/2019  Visit #7/12 neck/HA,  29/16+12+12 thoracic/ribs  Referral Diagnosis: Headaches and neck pain  Thoracic radiculitis   Thoracic degenerative disc disease   Chronic bilateral thoracic back pain   Chronic bilateral low back pain without sciatica   Myofascial pain   Referring provider:Eric Ramos MD, Amanda Sandhu C*  Visit Diagnosis:     ICD-10-CM    1. Pain in thoracic spine M54.6    2. Chronic bilateral low back pain without sciatica M54.5     G89.29    3. Rib pain R07.81    4. Migraine without aura and without status migrainosus, not intractable G43.009    5. Cervicalgia M54.2          Assessment:     HEP/POC compliance is  good .  Patient demonstrates understanding/independence with home program.  Response to Intervention good  Patient is benefitting from skilled physical therapy and is making steady progress toward functional goals.  Patient is appropriate to continue with skilled physical therapy intervention, as indicated by initial plan of care.    Goal Status:  Pt. will demonstrate/verbalize independence in self-management of condition in : 12 weeks;Progressing toward  Pt. will be independent with home exercise program in : 12 weeks;Progressing toward  Pt. will report decreased intensity, frequency of : Pain;in 12 weeks;Comment;Progressing toward  Comment:: decrease thoracic pain by 25% or better  status: making progress. Overall pain level in lower thoracic area has improved.  Pt. will have improved quality of sleep: waking less times/night;in 12 weeks;Comment;Progressing toward  Comment:: waking 0-1x /night     Pt. will be able to walk : 60 minutes;with less pain;with less difficulty;for community mobility;for exercise/recreation;in 12 weeks;Comment;Progressing toward  Comment:: status: tolerance 30-45 min  Patient will stand : 60 minutes;with less difficultty;with less pain;for home chores;in 12  weeks;Progressing toward;Comment  Comment:: status: tolerance 15 min  Pt. will bend: to dress;to clean;with less pain;with less difficulty;for self care;for exercise/recreation;in 12 weeks;Progressing toward;Comment  Comment:: status: improved tolerance for doing yard work and shoveling snow.   Patient will twist/turn : in bed;while standing;with less difficulty;with less pain;for bed mobilitiy;in 12 weeks;Progressing toward  Patient will return to: exercise;sport;in 12 weeks;Progressing toward;Comment  Comment: status: has been doing some modified yoga    Pt will: report decreased HA frequency to 0-1x/week, 50% decreased duration of symptoms in 8 weeks.  status: making progress      Plan / Patient Education:     Continue with initial plan of care.  Progress with home program as tolerated.  continue PT 1x/week.    Subjective:     Pain Ratin-3  Has been packing in a lot of self care right now, including acupuncture, reiki, yoga. Did 2 vinyasa yoga classes with modifications in the past week. Last session helped a lot with migraine symptoms. Didn't have to take any medication to resolve it. No major headaches since the last session. Main complaint today is lower rib cage/lower thoracic pain.     Objective:     Cranial scan is (+) for (L) lower thoracic myochain, (L) thoracic ligamentous, (B) post abdominal viscera  Mod/major tenderness of (L) lat lower ribs, (B) lower thoracic rib tubercles.  Mod fascial restrictions of respiratory diaphragm.     Treatment Today     TREATMENT MINUTES COMMENTS   Evaluation     Self-care/ Home management     Manual therapy 55 Induction, indirect, direct techniques utilized as appropriate for optimal tissue release.   MFR/SCS - (L) AR11-MS, (L) AR10-MS, (L) [id number], (L) KS-V, (R) DP-V, respiratory diaphragm    Neuromuscular Re-education     Therapeutic Activity     Therapeutic Exercises     Gait training     Modality__________________                Total 55    Blank areas are  intentional and mean the treatment did not include these items.       Vaishali Gallego  12/10/2019

## 2021-06-04 NOTE — PATIENT INSTRUCTIONS - HE
Discussed the importance of core strengthening, ROM, stretching exercises with the patient and how each of these entities is important in decreasing pain.  Explained to the patient that the purpose of physical therapy is to teach the patient a home exercise program.  These exercises need to be performed every day in order to decrease pain and prevent future occurrences of pain.        ~Please call Nurse Navigation line [phone] with any questions or concerns about your treatment plan, if symptoms worsen and you would like to be seen urgently, or if you have problems controlling bladder and bowel function.  ~Follow Up Appointment time slots with Amanda Sandhu CNP with [facility], are also available at the [facility] location near [facility] on the first and third THURSDAY afternoons of each month.

## 2021-06-04 NOTE — PROGRESS NOTES
Assessment:     Diagnoses and all orders for this visit:    Thoracic radiculitis    Chronic bilateral thoracic back pain    Thoracic degenerative disc disease    Thoracic disc herniation    Chronic bilateral low back pain without sciatica    Lumbar facet arthropathy    Myofascial pain       Aysha Gallagher is a 53 y.o. y.o. female with past medical history significant for migraine without aura, urolithiasis who presents today for follow-up regarding:    -Chronic thoracic spine pain with bilateral radiculitis, MRI does show T11-12 disc extrusion with degenerative changes and degenerative edema likely contributing to her pain.  Improvement with physical therapy.    -Chronic less intense bilateral low back pain at the lumbosacral junction more likely related to facet arthropathy.  No current radicular symptoms.     -Mild chronic neck pain.     Plan:     A shared decision making plan was used. The patient's values and choices were respected. Prior medical records from 7/11/19 were reviewed today. The following represents what was discussed and decided upon by the provider and the patient.        -DIAGNOSTIC TESTS: Images were personally reviewed and interpreted.   --Lumbar spine MRI 9/5/2019 shows mild to moderate facet arthritis L4-5 and mild L5-S1.  L4-5 disc degeneration, no foraminal central nerve compression at any level.  --Thoracic spine MRI 9/5/2019 shows mild to moderate degenerative disc changes with right disc extrusion T11-12 with facet arthropathy.  Degenerative endplate changes T4-5 and T11-12.  --Reviewed abdominal/pelvic CT scan from 8/22/2017 which did show degenerative disc changes at T11-12 with possible disc bulge shadowing noted.  There is also lower lumbar facet arthropathy noted.    -INTERVENTIONS: Discussed that if her thoracic spine pain is not improving we could trial a T11-12 interlaminar epidural steroid injection targeting the degenerative changes and disc herniation as well as  radiculitis symptoms at this level.  --Discussed that if her low back pain worsens on the road we could consider a bilateral L4-5 facet joint steroid injection.  -Patient is doing well and wants to hold off on injections at this time.    -MEDICATIONS: No change in medications advised patient to continue over-the-counter Tylenol and ibuprofen as needed.  -She did discontinue gabapentin since her pain is improved.  Discussed side effects of medications and proper use. Patient verbalized understanding.    -PHYSICAL THERAPY: Encourage patient to continue with home exercises on a regular basis at this time to prevent further degenerative changes down the road.  Discussed the importance of core strengthening, ROM, stretching exercises with the patient and how each of these entities is important in decreasing pain.  Explained to the patient that the purpose of physical therapy is to teach the patient a home exercise program.  These exercises need to be performed every day in order to decrease pain and prevent future occurrences of pain.        -PATIENT EDUCATION:  20 minutes of total visit time was spent face to face with the patient today, 60 % of the visit was spent on counseling, education, and coordinating care.   -5 minutes spent outside of visit time, non-face-to-face time, reviewing chart.    -FOLLOW UP: Follow-up as needed  Advised to contact clinic if symptoms worsen or change.    Subjective:     Aysha SALAZAR Elliott is a 53 y.o. female who presents today for follow-up regarding ongoing chronic thoracic spine pain at the lower thoracic region that is her biggest concern that does radiate to the anterior abdomen bilaterally, overall her pain is a 3/10, 7 at its worst, a 2 at its best and she has noticed improvement with physical therapy and it is more manageable and tolerable at this time.  She does still note increased pain with lifting twisting, transition from sit to stand, bending, prolonged sitting or  standing as well as lying.  Patient does report that in general her thoracic and low back pain is worse first thing in the morning as well.  Her low back pain is less and tense than her Thoracics pain and is bilateral at the lower lumbar junction, no current leg symptoms, patient denies numbness or tingling sensations, denies lower extremity weakness, denies recent trips or falls or balance changes, denies bowel or bladder loss control.  Very mild neck pain she states.    Treatment to Date: No prior spinal surgery or spinal injection.  Physical therapy x7 sessions neck and 29 sessions thoracic pain [facility] 12/10/2018.    Medications:  Gabapentin previously when pain was severe however she has stopped it at this time.  Tizanidine 4 mg  Flexeril 5 mg    Patient Active Problem List   Diagnosis     Urolithiasis     Breast lump on right side at 10 o'clock position     Acute left-sided low back pain without sciatica     Migraine without aura       Current Outpatient Medications on File Prior to Encounter   Medication Sig Dispense Refill     SUMAtriptan (IMITREX) 25 MG tablet Take 50 mg by mouth every 2 (two) hours as needed for migraine.       b complex vitamins tablet Take 1 tablet by mouth daily.       calcium citrate (CALCITRATE) 200 mg (950 mg) tablet Take 1 tablet by mouth daily.       CHOLECALCIFEROL, VITAMIN D3, (VITAMIN D3 ORAL) Take by mouth.       LORazepam (ATIVAN) 0.5 MG tablet Take 1 tablet (0.5 mg total) by mouth every 8 (eight) hours as needed for anxiety. 30 tablet 0     magnesium oxide 250 mg Tab Take by mouth.       NON FORMULARY HERBAL SUPPLEMENT FROM ACCUPUNCTURIST       OM-3/E/LINOL/ALA/OLEIC/GLA/LIP (OMEGA 3-6-9 ORAL) Take by mouth.       sertraline (ZOLOFT) 50 MG tablet TAKE 1 TABLET(50 MG) BY MOUTH DAILY 30 tablet 2     traZODone (DESYREL) 50 MG tablet Take 1 tablet (50 mg total) by mouth at bedtime. 30 tablet 1     [DISCONTINUED] cyclobenzaprine (FLEXERIL) 5 MG tablet Take 5 mg by mouth 3  (three) times a day as needed for muscle spasms.       [DISCONTINUED] gabapentin (NEURONTIN) 100 MG capsule Take 300 mg by mouth 3 (three) times a day. Follow Gabapentin Dosing chart given 270 capsule 3     [DISCONTINUED] tiZANidine (ZANAFLEX) 4 MG tablet TK SS T PO QHS THEN INCREASE IN SS T INCREMENTS TO 4 TS D AS TOLERATED  2     No current facility-administered medications on file prior to encounter.        No Known Allergies    Past Medical History:   Diagnosis Date     Depression      Kidney stone     2016     Lactose intolerance      Migraine         Review of Systems  ROS: Positive for headache.  Specifically negative for bowel/bladder dysfunction, balance changes, headache, dizziness, foot drop, fevers, chills, appetite changes, nausea/vomiting, unexplained weight loss. Otherwise 13 systems reviewed are negative. Please see the patient's intake questionnaire from today for details.    Reviewed Social, Family, Past Medical and Past Surgical history with patient, no significant changes noted since prior visit.     Objective:     /63 (Patient Site: Right Arm, Patient Position: Sitting)   Pulse 63   Wt 142 lb (64.4 kg)   SpO2 100%   BMI 20.97 kg/m      PHYSICAL EXAMINATION:    --CONSTITUTIONAL: Well developed, well nourished, healthy appearing individual.  --PSYCHIATRIC: Appropriate mood and affect. No difficulty interacting due to temper, social withdrawal, or memory issues.  --SKIN: Lumbar region is dry and intact.   --RESPIRATORY: Normal rhythm and effort. No abnormal accessory muscle breathing patterns noted.   --MUSCULOSKELETAL:  Normal lumbar lordosis noted, no lateral shift.  --GROSS MOTOR: Easily arises from a seated position. Gait is non-antalgic  --LUMBAR SPINE:  Inspection reveals no evidence of deformity.     RESULTS:   Imaging: Lumbar spine imaging was reviewed today. The images were shown to the patient and the findings were explained using a spine model.      MR THORACIC SPINE WITHOUT  CONTRAST  MR LUMBAR SPINE WITHOUT CONTRAST  09/05/2019  INDICATION: Chronic thoracic pain. Thoracic osteoarthritis. Low back pain.  TECHNIQUE: Routine.  CONTRAST: None.  COMPARISON: Abdomen and pelvis CT 08/22/2017.  FINDINGS:  MRI THORACIC SPINE:  image demonstrates degenerative disc osteophyte complexes in the lower cervical spine C4-C5 through C6-C7 with mild spinal canal narrowing. Normal thoracic vertebral body heights. No compression fracture deformity or subluxation. Normal thoracic spinal cord caliber and signal. T2 hyperintense degenerative endplate changes ventrally at T4-T5 and T11-T12. Mild edema at the left costovertebral junction. Otherwise, benign marrow signal. Mild to moderate degenerative loss of disc height and signal T2-T3 through T5-T6. Small disc herniations at these levels as well as an asymmetric right-sided disc extrusion at T11-T12. This mildly narrows the right neural foramen. Mild facet arthropathy T3-T4. No central thoracic spinal canal stenosis.  MRI LUMBAR SPINE: Nomenclature is based on five lumbar-type vertebral bodies. Normal vertebral body heights. Slight anterolisthesis of L4. The visualized portions of the bony pelvis and proximal femora are unremarkable for age. No marrow edema. No pars defect. The conus tip is identified at L1-L2. Unremarkable paraspinal soft tissues. No abdominal aortic aneurysm.  T12-L1: Normal disc height and signal. No herniation. No facet arthropathy. No spinal canal stenosis. No foraminal stenosis.   L1-L2: Normal disc height and signal. No herniation. No facet arthropathy. No spinal canal stenosis. No foraminal stenosis.   L2-L3: Normal disc height and signal. No herniation. No facet arthropathy. No spinal canal stenosis. No foraminal stenosis.   L3-L4: Mild loss of disc height and signal. Shallow disc bulge. No facet arthropathy. Slight lateral recess narrowing. Adequate central canal. No foraminal stenosis.   L4-L5: Moderate loss of disc signal.  Mild loss of disc height. Shallow disc bulge. Moderate facet arthropathy. No spinal canal stenosis. No foraminal stenosis.   L5-S1: Normal disc height and signal. No herniation. Mild facet arthropathy. No spinal canal stenosis. No foraminal stenosis.   CONCLUSION:  MRI THORACIC SPINE:  1.  Mild to moderate disc degeneration throughout the thoracic spine. There is an asymmetric right disc extrusion at T11-T12 that mildly narrows the neural foramen. Mild facet arthropathy.  2.  Normal thoracic spinal cord.  3.  Edematous degenerative endplate changes T4-T5 and T11-T12. Otherwise, unremarkable marrow.  MRI LUMBAR SPINE:  1.  Mild to moderate disc and facet degeneration at L4-L5 with slight anterolisthesis of L4. No stenosis.  2.  Shallow disc bulge at L3-L4 slightly narrows the lateral recesses.  3.  Mild facet arthropathy at L5-S1.      CT ABDOMEN PELVIS WO ORAL WO IV CONTRAST  8/22/2017   INDICATION: Flank pain, stone known or suspected  TECHNIQUE: Routine CT abdomen and pelvis without oral or IV contrast. Multiplanar reformation images (MPR). Dose reduction techniques were used.  COMPARISON: 9/23/2016 CT.  FINDINGS:  LUNG BASES: Negative.  ABDOMEN: No renal or ureteral stones. No hydronephrosis. Unenhanced liver, spleen, adrenals, gallbladder, and pancreas appear normal.  PELVIS: Moderate amount stool in colon. Normal-appearing small bowel.  MUSCULOSKELETAL: Mild lower lumbar facet arthropathy.  CONCLUSION:  1.  No renal or ureteral stone seen.

## 2021-06-04 NOTE — PROGRESS NOTES
[facility] Daily Progress     Patient Name: Aysha Raya-Otremba  Date: 12/20/2019  Visit #8/12 neck/HA,  30/16+12+12 thoracic/ribs  Referral Diagnosis: Headaches and neck pain  Thoracic radiculitis   Thoracic degenerative disc disease   Chronic bilateral thoracic back pain   Chronic bilateral low back pain without sciatica   Myofascial pain   Referring provider:Eric Ramos MD, Amanda Sandhu C*  Visit Diagnosis:     ICD-10-CM    1. Pain in thoracic spine M54.6    2. Chronic bilateral low back pain without sciatica M54.5     G89.29    3. Rib pain R07.81    4. Migraine without aura and without status migrainosus, not intractable G43.009    5. Cervicalgia M54.2          Assessment:     HEP/POC compliance is  good .  Patient demonstrates understanding/independence with home program.  Response to Intervention good  Patient is benefitting from skilled physical therapy and is making steady progress toward functional goals.  Patient is appropriate to continue with skilled physical therapy intervention, as indicated by initial plan of care.    Goal Status:  Pt. will demonstrate/verbalize independence in self-management of condition in : 12 weeks;Progressing toward  Pt. will be independent with home exercise program in : 12 weeks;Progressing toward  Pt. will report decreased intensity, frequency of : Pain;in 12 weeks;Comment;Progressing toward  Comment:: decrease thoracic pain by 25% or better  status: making progress. Overall pain level in lower thoracic area has improved.  Pt. will have improved quality of sleep: waking less times/night;in 12 weeks;Comment;Progressing toward  Comment:: waking 0-1x /night     Pt. will be able to walk : 60 minutes;with less pain;with less difficulty;for community mobility;for exercise/recreation;in 12 weeks;Comment;Progressing toward  Comment:: status: tolerance 30-45 min  Patient will stand : 60 minutes;with less difficultty;with less pain;for home chores;in 12  weeks;Progressing toward;Comment  Comment:: status: tolerance 15 min  Pt. will bend: to dress;to clean;with less pain;with less difficulty;for self care;for exercise/recreation;in 12 weeks;Progressing toward;Comment  Comment:: status: improved tolerance for doing yard work and shoveling snow.   Patient will twist/turn : in bed;while standing;with less difficulty;with less pain;for bed mobilitiy;in 12 weeks;Progressing toward  Patient will return to: exercise;sport;in 12 weeks;Progressing toward;Comment  Comment: status: has been doing some modified yoga    Pt will: report decreased HA frequency to 0-1x/week, 50% decreased duration of symptoms in 8 weeks.  status: making progress      Plan / Patient Education:     Continue with initial plan of care.  Progress with home program as tolerated.    Subjective:     Pain Ratin  Reports increased upper lumbar/lower thoracic pain since the last session. Notes increased pain with longer gap between sessions. Has been doing other interventions - reiki, acupuncture, qi gong, massage, yoga. Had a migraine on Monday. Frequency seems to be about every few weeks.   Had follow up with [facility] yesterday. She declined proceeding with [facility] for now.     Objective:     Cranial scan is (+) for (L) TL myochains, (R) TL ALL,   Tenderness of (B) lower thoracic and upper lumbar spine, (R) ant/lat lower rib cage, (R) post/medial femur    Treatment Today     TREATMENT MINUTES COMMENTS   Evaluation     Self-care/ Home management     Manual therapy 55 Induction, indirect, direct techniques utilized as appropriate for optimal tissue release.   MFR/SCS - (L) LEL1-MS, (L) KGV64-US, (R) YPM41-TS, (R) ALLL1-2-MS, (R) AR8-9-10-MS, respiratory diaphragm, asc colon,    Neuromuscular Re-education     Therapeutic Activity     Therapeutic Exercises     Gait training     Modality__________________                Total 55    Blank areas are intentional and mean the treatment did not include these items.        Vaishali Gallego  12/20/2019    [facility] Discharge Summary  Patient Name: Aysha Raya-Otremba  Date: 2/7/2020  Referral Diagnosis: Headaches and neck pain  Thoracic radiculitis   Thoracic degenerative disc disease   Chronic bilateral thoracic back pain   Chronic bilateral low back pain without sciatica   Myofascial pain   Referring provider:Eric Ramos MD, Amanda Sandhu C*  Visit Diagnosis:   1. Pain in thoracic spine     2. Chronic bilateral low back pain without sciatica     3. Rib pain     4. Migraine without aura and without status migrainosus, not intractable     5. Cervicalgia         Goals:  Pt. will demonstrate/verbalize independence in self-management of condition in : 12 weeks;Progressing toward  Pt. will be independent with home exercise program in : 12 weeks;Progressing toward  Pt. will report decreased intensity, frequency of : Pain;in 12 weeks;Comment;Progressing toward  Comment:: decrease thoracic pain by 25% or better  status: making progress. Overall pain level in lower thoracic area has improved.  Pt. will have improved quality of sleep: waking less times/night;in 12 weeks;Comment;Progressing toward  Comment:: waking 0-1x /night     Pt. will be able to walk : 60 minutes;with less pain;with less difficulty;for community mobility;for exercise/recreation;in 12 weeks;Comment;Progressing toward  Comment:: status: tolerance 30-45 min  Patient will stand : 60 minutes;with less difficultty;with less pain;for home chores;in 12 weeks;Progressing toward;Comment  Pt. will bend: to dress;to clean;with less pain;with less difficulty;for self care;for exercise/recreation;in 12 weeks;Progressing toward;Comment  Comment:: status: improved tolerance for doing yard work and shoveling snow.   Patient will twist/turn : in bed;while standing;with less difficulty;with less pain;for bed mobilitiy;in 12 weeks;Progressing toward  Patient will return to: exercise;sport;in 12 weeks;Progressing  toward;Comment  Comment: status: has been doing some modified yoga    Pt will: report decreased HA frequency to 0-1x/week, 50% decreased duration of symptoms in 8 weeks.  status: making progress      Patient was seen for 30 visits from 7/19/19 to 12/20/19 with 6 missed appointments.  Patient had good response to PT interventions with decreased pain, improved mobility and functional ability.  Patient received a home program for ROM, flexibility  No further therapy is required at this time.    Therapy will be discontinued at this time.  The patient will need a new referral to resume.    Thank you for your referral.  Vaishali Gallego  2/7/2020  9:40 AM

## 2021-06-12 NOTE — PROGRESS NOTES
ASSESSMENT:     Diagnoses and all orders for this visit:    Lumbalgia  -     gabapentin (NEURONTIN) 300 MG capsule; Take 1 capsule (300 mg total) by mouth daily.  Dispense: 30 capsule; Refill: 1  -     Cancel: Ambulatory referral to Physical Therapy  -     Ambulatory referral to Physical Therapy    Lumbar radiculitis  -     gabapentin (NEURONTIN) 300 MG capsule; Take 1 capsule (300 mg total) by mouth daily.  Dispense: 30 capsule; Refill: 1  -     Cancel: Ambulatory referral to Physical Therapy  -     Ambulatory referral to Physical Therapy    Myofascial pain  -     gabapentin (NEURONTIN) 300 MG capsule; Take 1 capsule (300 mg total) by mouth daily.  Dispense: 30 capsule; Refill: 1  -     Cancel: Ambulatory referral to Physical Therapy  -     Ambulatory referral to Physical Therapy            Aysha Gallagher is a 51 y.o. female  with a BMI of Body mass index is 20.39 kg/(m^2). who presents today in consultation at the request of Dr. Aleman, Yumiko PABLO, AP*  for new patient evaluation of acute left-sided mid to low back pain that radiates to the left hip.  Patient symptoms started suddenly without a trauma.  She has a history of kidney stones in the past.  CT abdomen pelvis without contrast negative for renal or ureteral stone, with no spleen or adrenal glands or pancreas abnormalities.  I recommend lumbar MRI to rule out disc herniation possibly at the L1-L2 levels that may be causing patient current symptoms.  Patient symptoms also could be myofascial in nature.  Patient would like to defer lumbar MRI at this time.  She had a lumbar MRI that was done back in 2016.  Patient will bring the lumbar MRI reports and possibly imaging with her.  I recommend patient to start with gabapentin 3 mg 1 tablet at nighttime, and to start with physical therapy.  Patient will call us to request  a lumbar MRI if her symptoms worsens.  No red flags.           EDMUNDO:  52  WHO-5:  9    PLAN:  A shared decision making model was  used.  The patient's values and choices were respected.  The following represents what was discussed and decided upon by the physician and the patient.      1.  DIAGNOSTIC TESTS: Patient declines lumbar MRI at this time.  She will bring us the lumbar MRI from last year.  2.  PHYSICAL THERAPY:  Discussed the importance of core strengthening, ROM, stretching exercises with the patient and how each of these entities is important in decreasing pain.  Explained to the patient that the purpose of physical therapy is to teach the patient a home exercise program.  These exercises need to be performed every day in order to decrease pain and prevent future occurrences of pain.  Likened it to brushing one's teeth.  Patient will start on physical therapy - [facility] at [facility].  3.  MEDICATIONS: Patient will start on gabapentin 3 mg 1 tablet orally at nighttime.  4.  INTERVENTIONS: No therapeutic steroid injections at this time.   5.  PATIENT EDUCATION: I thoroughly discussed the plan with the patient today.  She verbalizes understanding and agrees with the plan.      FOLLOW-UP:   Patient will follow up with me in 4 weeks.  All questions were answered.      MIL Woo, MPA-C          SUBJECTIVE:  Aysha Gallagher  Is a 51 y.o. female who presents today for new patient evaluation of low back pain.  Patient reports left-sided mid to low back pain radiating to the left hip for the last week and a half.  At this time she reports 5 out of 10 intensity dull achy pain.  Her symptoms are aggravated by bending over, walking and rates it at 8 out of 10 intensity on its worst.  Her symptoms are mildly alleviated by taking ibuprofen 800 mg to 3 times a day, and hydrocodone acetaminophen 5-3 25 mg as needed for severe pain that she received from the emergency room.  Patient has a history of kidney stones that started back in fall 2016.  Patient stated that she had kidney stones on the right side in the past.   She was evaluated at the [facility] kidney stone [place] on August 22 of 2017.  CT abdomen pelvis without oral contrast that was done on August 22 of 2017 revealed no renal or ureteral stones.  No hydronephrosis, no unenhanced liver spleen adrenal glands, gallbladder and pancreas all appear within normal.  Patient reports history of back pain in the past.  She stated that she had lumbar MRI back in spring 2016 however she does not recall where exactly she had the MRI imaging.  Patient states that at that time the MRI did not reveal any disc herniation.  Today patient also reports mild weakness of the left hip.  She is not sure if that is due to being or she is trying to guard  her hip, due to pain. Patient denies urinary or bowel incontinence, unintentional weight loss, saddle anesthesia, fever or chills, balance difficulties.      Medications:    Current Outpatient Prescriptions   Medication Sig Dispense Refill     b complex vitamins tablet Take 1 tablet by mouth daily.       calcium citrate (CALCITRATE) 200 mg (950 mg) tablet Take 1 tablet by mouth daily.       CHOLECALCIFEROL, VITAMIN D3, (VITAMIN D3 ORAL) Take by mouth.       cyclobenzaprine (FLEXERIL) 5 MG tablet Take 5 mg by mouth 3 (three) times a day as needed for muscle spasms.       HYDROcodone-acetaminophen 5-325 mg per tablet Take 1 tablet by mouth every 4 (four) hours as needed for pain.       magnesium oxide 250 mg Tab Take by mouth.       NON FORMULARY HERBAL SUPPLEMENT FROM ACCUPUNCTURIST       OM-3/E/LINOL/ALA/OLEIC/GLA/LIP (OMEGA 3-6-9 ORAL) Take by mouth.       SUMAtriptan (IMITREX) 25 MG tablet Take 50 mg by mouth every 2 (two) hours as needed for migraine.       gabapentin (NEURONTIN) 300 MG capsule Take 1 capsule (300 mg total) by mouth daily. 30 capsule 1     No current facility-administered medications for this encounter.        Allergies: No Known Allergies    PMH:    Past Medical History:   Diagnosis Date     Kidney stone     2016      Lactose intolerance      Migraine        PSH:    Past Surgical History:   Procedure Laterality Date     BREAST BIOPSY Right 2015    benign     CYSTOSCOPY W/ URETERAL STENT PLACEMENT Right 9/30/2016    Procedure: CYSTOSCOPY RIGHT URETEROSCOPIC STONE EXTRACTION, STENT REMOVAL ;  Surgeon: Ruperto Martinez MD;  Location: [facility] OR;  Service:      KIDNEY STONE SURGERY  09/2016     NO PAST SURGERIES         Family History:    Family History   Problem Relation Age of Onset     Breast cancer Mother 77     Diabetes Mother      Hypertension Mother      Kidney disease Mother      Heart attack Father      No Medical Problems Sister      No Medical Problems Sister      No Medical Problems Sister      Gout Mother      Cancer Mother      breast     Heart disease Father      Heart disease Maternal Grandmother      Heart disease Maternal Grandfather      Heart disease Paternal Grandmother      Heart disease Paternal Grandfather      Urolithiasis Neg Hx      Clotting disorder Neg Hx        Social History:   Social History     Social History     Marital status:      Spouse name: N/A     Number of children: N/A     Years of education: N/A     Occupational History           Social History Main Topics     Smoking status: Never Smoker     Smokeless tobacco: Never Used     Alcohol use Yes      Comment: rare     Drug use: No     Sexual activity: Not on file     Other Topics Concern     Not on file     Social History Narrative    Patient presented to the ED with  and son 05/08/17                ROS: Left mid to low back pain radiating to the left hip.  Specifically negative for bowel/bladder dysfunction, fevers,chills, appetite changes, unexplained weight loss.   Otherwise 13 systems reviewed are negative.  Please see the patient's intake questionnaire from today for details.      OBJECTIVE:  PHYSICAL EXAMINATION:    CONSTITUTIONAL:  Vital signs as above.  No acute distress.  The patient  is well nourished and well groomed.  PSYCHIATRIC:  The patient is awake, alert, oriented to person, place, time and answering questions appropriately with clear speech.    SKIN:  Skin over the face, bilateral lower extremities, and posterior torso is clean, dry, intact without rashes.    GAIT:  Gait is non-antalgic.  The patient is able to heel and toe walk without significant difficulty.    STANDING EXAMINATION: Mild tenderness at the left L3-L4, L4-L5 paraspinal muscle.  Mild tenderness at the left SI joint.  MUSCLE STRENGTH:  The patient has 5/5 strength for the bilateral hip flexors, knee flexors/extensors, ankle dorsiflexors/plantar flexors, great toe extensors, ankle evertors/invertors.  NEUROLOGICAL:  2/4 patellar, medial hamstring, and achilles reflexes bilaterally.  Negative Babinski's bilaterally.  No ankle clonus bilaterally. Sensation to light touch is intact in the bilateral L4, L5, and S1 dermatomes.  VASCULAR:  2/4  pulses bilaterally.  Bilateral lower extremities are warm.  There is no pitting edema of the bilateral lower extremities.  ABDOMINAL:  Soft, non-distended, non-tender throughout all quadrants.  No pulsatile mass palpated in the left lower quadrant.  LYMPH NODES:  No palpable or tender inguinal/popliteal lymph nodes.  MUSCULOSKELETAL: Negative straight leg raise bilaterally.  Positive left Nikhil test.  Negative hip impingement bilaterally.      RESULTS:      CT ABDOMEN PELVIS WO ORAL WO IV CONTRAST  8/22/2017 9:30 AM     INDICATION: Flank pain, stone known or suspected  TECHNIQUE: Routine CT abdomen and pelvis without oral or IV contrast. Multiplanar reformation images (MPR). Dose reduction techniques were used.  COMPARISON: 9/23/2016 CT.     FINDINGS:  LUNG BASES: Negative.     ABDOMEN: No renal or ureteral stones. No hydronephrosis. Unenhanced liver, spleen, adrenals, gallbladder, and pancreas appear normal.     PELVIS: Moderate amount stool in colon. Normal-appearing small  bowel.     MUSCULOSKELETAL: Mild lower lumbar facet arthropathy.     IMPRESSION:   CONCLUSION:  1.  No renal or ureteral stone seen.
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
## 2021-07-03 NOTE — ADDENDUM NOTE
Addendum Note by Amanda Sandhu CNP at 7/11/2019 12:58 PM     Author: Amanda Sandhu CNP Service: -- Author Type: Nurse Practitioner    Filed: 7/11/2019  2:03 PM Date of Service: 7/11/2019 12:58 PM Status: Signed    : Amanda Sandhu CNP (Nurse Practitioner)    Encounter addended by: Amanda Sandhu CNP on: 7/11/2019  2:03 PM      Actions taken: Sign clinical note

## 2021-09-19 ENCOUNTER — HEALTH MAINTENANCE LETTER (OUTPATIENT)
Age: 56
End: 2021-09-19

## 2022-01-08 ENCOUNTER — HEALTH MAINTENANCE LETTER (OUTPATIENT)
Age: 57
End: 2022-01-08

## 2022-11-20 ENCOUNTER — HEALTH MAINTENANCE LETTER (OUTPATIENT)
Age: 57
End: 2022-11-20

## 2022-12-29 NOTE — TELEPHONE ENCOUNTER
"Patient calling as she would like to request a referral for an MRI. Reports she has had several sessions of PT and has not seen too much progress. \"The pain is very similar to what it was before the PT. Basically thoracic from waistline up to between shoulder blades.\"   " Pt ambulated to purple 80 with steady gait. Chart up for ERP.

## 2023-04-15 ENCOUNTER — HEALTH MAINTENANCE LETTER (OUTPATIENT)
Age: 58
End: 2023-04-15

## 2024-02-03 ENCOUNTER — HEALTH MAINTENANCE LETTER (OUTPATIENT)
Age: 59
End: 2024-02-03